# Patient Record
Sex: FEMALE | Race: BLACK OR AFRICAN AMERICAN | Employment: FULL TIME | URBAN - METROPOLITAN AREA
[De-identification: names, ages, dates, MRNs, and addresses within clinical notes are randomized per-mention and may not be internally consistent; named-entity substitution may affect disease eponyms.]

---

## 2024-01-05 ENCOUNTER — HOSPITAL ENCOUNTER (EMERGENCY)
Facility: HOSPITAL | Age: 24
Discharge: HOME | End: 2024-01-06
Payer: COMMERCIAL

## 2024-01-05 DIAGNOSIS — S61.411A LACERATION OF RIGHT HAND INVOLVING TENDON, INITIAL ENCOUNTER: Primary | ICD-10-CM

## 2024-01-05 DIAGNOSIS — S66.921A LACERATION OF RIGHT HAND INVOLVING TENDON, INITIAL ENCOUNTER: Primary | ICD-10-CM

## 2024-01-05 PROCEDURE — 99284 EMERGENCY DEPT VISIT MOD MDM: CPT | Performed by: EMERGENCY MEDICINE

## 2024-01-05 PROCEDURE — 99284 EMERGENCY DEPT VISIT MOD MDM: CPT

## 2024-01-05 PROCEDURE — 12002 RPR S/N/AX/GEN/TRNK2.6-7.5CM: CPT

## 2024-01-05 ASSESSMENT — COLUMBIA-SUICIDE SEVERITY RATING SCALE - C-SSRS
2. HAVE YOU ACTUALLY HAD ANY THOUGHTS OF KILLING YOURSELF?: NO
1. IN THE PAST MONTH, HAVE YOU WISHED YOU WERE DEAD OR WISHED YOU COULD GO TO SLEEP AND NOT WAKE UP?: NO
6. HAVE YOU EVER DONE ANYTHING, STARTED TO DO ANYTHING, OR PREPARED TO DO ANYTHING TO END YOUR LIFE?: NO

## 2024-01-06 ENCOUNTER — APPOINTMENT (OUTPATIENT)
Dept: RADIOLOGY | Facility: HOSPITAL | Age: 24
End: 2024-01-06
Payer: COMMERCIAL

## 2024-01-06 VITALS
HEART RATE: 109 BPM | RESPIRATION RATE: 16 BRPM | DIASTOLIC BLOOD PRESSURE: 80 MMHG | TEMPERATURE: 98 F | SYSTOLIC BLOOD PRESSURE: 133 MMHG | OXYGEN SATURATION: 99 %

## 2024-01-06 PROCEDURE — 2500000004 HC RX 250 GENERAL PHARMACY W/ HCPCS (ALT 636 FOR OP/ED)

## 2024-01-06 PROCEDURE — 73110 X-RAY EXAM OF WRIST: CPT | Mod: RT

## 2024-01-06 PROCEDURE — 2500000001 HC RX 250 WO HCPCS SELF ADMINISTERED DRUGS (ALT 637 FOR MEDICARE OP)

## 2024-01-06 PROCEDURE — 2500000005 HC RX 250 GENERAL PHARMACY W/O HCPCS

## 2024-01-06 PROCEDURE — 73110 X-RAY EXAM OF WRIST: CPT | Mod: RIGHT SIDE | Performed by: RADIOLOGY

## 2024-01-06 PROCEDURE — 90715 TDAP VACCINE 7 YRS/> IM: CPT

## 2024-01-06 PROCEDURE — 90471 IMMUNIZATION ADMIN: CPT

## 2024-01-06 PROCEDURE — 73130 X-RAY EXAM OF HAND: CPT | Mod: RIGHT SIDE | Performed by: RADIOLOGY

## 2024-01-06 PROCEDURE — 73130 X-RAY EXAM OF HAND: CPT | Mod: RT

## 2024-01-06 RX ORDER — LIDOCAINE HYDROCHLORIDE 10 MG/ML
20 INJECTION INFILTRATION; PERINEURAL ONCE
Status: COMPLETED | OUTPATIENT
Start: 2024-01-06 | End: 2024-01-06

## 2024-01-06 RX ORDER — CEPHALEXIN 250 MG/1
500 CAPSULE ORAL ONCE
Status: COMPLETED | OUTPATIENT
Start: 2024-01-06 | End: 2024-01-06

## 2024-01-06 RX ORDER — CEPHALEXIN 500 MG/1
500 CAPSULE ORAL 4 TIMES DAILY
Qty: 40 CAPSULE | Refills: 0 | Status: SHIPPED | OUTPATIENT
Start: 2024-01-06 | End: 2024-01-16

## 2024-01-06 RX ADMIN — TETANUS TOXOID, REDUCED DIPHTHERIA TOXOID AND ACELLULAR PERTUSSIS VACCINE, ADSORBED 0.5 ML: 5; 2.5; 8; 8; 2.5 SUSPENSION INTRAMUSCULAR at 00:10

## 2024-01-06 RX ADMIN — LIDOCAINE HYDROCHLORIDE 200 MG: 10 INJECTION, SOLUTION INFILTRATION; PERINEURAL at 00:05

## 2024-01-06 RX ADMIN — CEPHALEXIN 500 MG: 250 CAPSULE ORAL at 00:15

## 2024-01-06 ASSESSMENT — PAIN DESCRIPTION - PAIN TYPE: TYPE: ACUTE PAIN

## 2024-01-06 ASSESSMENT — PAIN - FUNCTIONAL ASSESSMENT: PAIN_FUNCTIONAL_ASSESSMENT: 0-10

## 2024-01-06 ASSESSMENT — PAIN SCALES - GENERAL: PAINLEVEL_OUTOF10: 10 - WORST POSSIBLE PAIN

## 2024-01-06 ASSESSMENT — PAIN DESCRIPTION - LOCATION: LOCATION: HAND

## 2024-01-06 ASSESSMENT — PAIN DESCRIPTION - ORIENTATION: ORIENTATION: RIGHT
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
## 2024-01-06 NOTE — DISCHARGE INSTRUCTIONS
You were seen today in the emergency department due to concerns following a laceration to your wrist.  We updated your tetanus.  Your laceration was closed by orthopedics.  Please attend the follow-up appointment that they have given you.  Please return for worsening weakness numbness tingling or coldness in your hand.  Please return for signs of infection which include fevers chills redness in the area or pus draining from the area.  I sent your prescription for Keflex which is an antibiotic please take this as directed for the full course even if you feel well.
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
## 2024-01-09 ENCOUNTER — OFFICE VISIT (OUTPATIENT)
Dept: ORTHOPEDIC SURGERY | Facility: CLINIC | Age: 24
End: 2024-01-09
Payer: COMMERCIAL

## 2024-01-09 VITALS — HEIGHT: 63 IN | BODY MASS INDEX: 24.27 KG/M2 | WEIGHT: 137 LBS

## 2024-01-09 DIAGNOSIS — S66.921A LACERATION OF RIGHT WRIST WITH TENDON INVOLVEMENT, INITIAL ENCOUNTER: Primary | ICD-10-CM

## 2024-01-09 DIAGNOSIS — S61.511A LACERATION OF RIGHT WRIST WITH TENDON INVOLVEMENT, INITIAL ENCOUNTER: Primary | ICD-10-CM

## 2024-01-09 DIAGNOSIS — S66.921A: ICD-10-CM

## 2024-01-09 DIAGNOSIS — S61.511A: ICD-10-CM

## 2024-01-09 PROCEDURE — 99204 OFFICE O/P NEW MOD 45 MIN: CPT | Performed by: ORTHOPAEDIC SURGERY

## 2024-01-09 PROCEDURE — 1036F TOBACCO NON-USER: CPT | Performed by: ORTHOPAEDIC SURGERY

## 2024-01-09 ASSESSMENT — PAIN - FUNCTIONAL ASSESSMENT: PAIN_FUNCTIONAL_ASSESSMENT: NO/DENIES PAIN
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
## 2024-01-11 ENCOUNTER — ANESTHESIA (OUTPATIENT)
Dept: OPERATING ROOM | Facility: HOSPITAL | Age: 24
End: 2024-01-11
Payer: COMMERCIAL

## 2024-01-11 ENCOUNTER — ANESTHESIA EVENT (OUTPATIENT)
Dept: OPERATING ROOM | Facility: HOSPITAL | Age: 24
End: 2024-01-11
Payer: COMMERCIAL

## 2024-01-11 ENCOUNTER — HOSPITAL ENCOUNTER (OUTPATIENT)
Facility: HOSPITAL | Age: 24
Setting detail: OUTPATIENT SURGERY
Discharge: HOME | End: 2024-01-11
Attending: ORTHOPAEDIC SURGERY | Admitting: ORTHOPAEDIC SURGERY
Payer: COMMERCIAL

## 2024-01-11 VITALS
HEART RATE: 99 BPM | DIASTOLIC BLOOD PRESSURE: 78 MMHG | OXYGEN SATURATION: 99 % | SYSTOLIC BLOOD PRESSURE: 126 MMHG | TEMPERATURE: 97.3 F | RESPIRATION RATE: 16 BRPM

## 2024-01-11 DIAGNOSIS — S61.511A: Primary | ICD-10-CM

## 2024-01-11 DIAGNOSIS — S66.921A: Primary | ICD-10-CM

## 2024-01-11 LAB — PREGNANCY TEST URINE, POC: NEGATIVE

## 2024-01-11 PROCEDURE — A25270 PR REFOREARM TEND/MUSC,EXTEN,PRIM,EA

## 2024-01-11 PROCEDURE — 94760 N-INVAS EAR/PLS OXIMETRY 1: CPT

## 2024-01-11 PROCEDURE — 81025 URINE PREGNANCY TEST: CPT | Performed by: ANESTHESIOLOGY

## 2024-01-11 PROCEDURE — 25270 REPAIR FOREARM TENDON/MUSCLE: CPT | Performed by: ORTHOPAEDIC SURGERY

## 2024-01-11 PROCEDURE — 7100000010 HC PHASE TWO TIME - EACH INCREMENTAL 1 MINUTE: Performed by: ORTHOPAEDIC SURGERY

## 2024-01-11 PROCEDURE — 3700000002 HC GENERAL ANESTHESIA TIME - EACH INCREMENTAL 1 MINUTE: Performed by: ORTHOPAEDIC SURGERY

## 2024-01-11 PROCEDURE — A25270 PR REFOREARM TEND/MUSC,EXTEN,PRIM,EA: Performed by: STUDENT IN AN ORGANIZED HEALTH CARE EDUCATION/TRAINING PROGRAM

## 2024-01-11 PROCEDURE — 2500000004 HC RX 250 GENERAL PHARMACY W/ HCPCS (ALT 636 FOR OP/ED): Performed by: ANESTHESIOLOGY

## 2024-01-11 PROCEDURE — 3700000001 HC GENERAL ANESTHESIA TIME - INITIAL BASE CHARGE: Performed by: ORTHOPAEDIC SURGERY

## 2024-01-11 PROCEDURE — 2500000004 HC RX 250 GENERAL PHARMACY W/ HCPCS (ALT 636 FOR OP/ED)

## 2024-01-11 PROCEDURE — 2500000005 HC RX 250 GENERAL PHARMACY W/O HCPCS: Performed by: ORTHOPAEDIC SURGERY

## 2024-01-11 PROCEDURE — 7100000002 HC RECOVERY ROOM TIME - EACH INCREMENTAL 1 MINUTE: Performed by: ORTHOPAEDIC SURGERY

## 2024-01-11 PROCEDURE — 7100000001 HC RECOVERY ROOM TIME - INITIAL BASE CHARGE: Performed by: ORTHOPAEDIC SURGERY

## 2024-01-11 PROCEDURE — 7100000009 HC PHASE TWO TIME - INITIAL BASE CHARGE: Performed by: ORTHOPAEDIC SURGERY

## 2024-01-11 PROCEDURE — 3600000003 HC OR TIME - INITIAL BASE CHARGE - PROCEDURE LEVEL THREE: Performed by: ORTHOPAEDIC SURGERY

## 2024-01-11 PROCEDURE — 3600000008 HC OR TIME - EACH INCREMENTAL 1 MINUTE - PROCEDURE LEVEL THREE: Performed by: ORTHOPAEDIC SURGERY

## 2024-01-11 PROCEDURE — 2500000005 HC RX 250 GENERAL PHARMACY W/O HCPCS

## 2024-01-11 RX ORDER — SCOLOPAMINE TRANSDERMAL SYSTEM 1 MG/1
1 PATCH, EXTENDED RELEASE TRANSDERMAL
Status: DISCONTINUED | OUTPATIENT
Start: 2024-01-11 | End: 2024-01-11 | Stop reason: HOSPADM

## 2024-01-11 RX ORDER — ONDANSETRON HYDROCHLORIDE 2 MG/ML
4 INJECTION, SOLUTION INTRAVENOUS ONCE AS NEEDED
Status: DISCONTINUED | OUTPATIENT
Start: 2024-01-11 | End: 2024-01-11 | Stop reason: HOSPADM

## 2024-01-11 RX ORDER — FENTANYL CITRATE 50 UG/ML
INJECTION, SOLUTION INTRAMUSCULAR; INTRAVENOUS AS NEEDED
Status: DISCONTINUED | OUTPATIENT
Start: 2024-01-11 | End: 2024-01-11

## 2024-01-11 RX ORDER — PROPOFOL 10 MG/ML
INJECTION, EMULSION INTRAVENOUS AS NEEDED
Status: DISCONTINUED | OUTPATIENT
Start: 2024-01-11 | End: 2024-01-11

## 2024-01-11 RX ORDER — CEFAZOLIN 1 G/1
INJECTION, POWDER, FOR SOLUTION INTRAVENOUS AS NEEDED
Status: DISCONTINUED | OUTPATIENT
Start: 2024-01-11 | End: 2024-01-11

## 2024-01-11 RX ORDER — CEFAZOLIN SODIUM 2 G/100ML
2 INJECTION, SOLUTION INTRAVENOUS ONCE
Status: DISCONTINUED | OUTPATIENT
Start: 2024-01-11 | End: 2024-01-11 | Stop reason: HOSPADM

## 2024-01-11 RX ORDER — ALBUTEROL SULFATE 0.83 MG/ML
2.5 SOLUTION RESPIRATORY (INHALATION) ONCE AS NEEDED
Status: DISCONTINUED | OUTPATIENT
Start: 2024-01-11 | End: 2024-01-11 | Stop reason: HOSPADM

## 2024-01-11 RX ORDER — LABETALOL HYDROCHLORIDE 5 MG/ML
5 INJECTION, SOLUTION INTRAVENOUS ONCE AS NEEDED
Status: DISCONTINUED | OUTPATIENT
Start: 2024-01-11 | End: 2024-01-11 | Stop reason: HOSPADM

## 2024-01-11 RX ORDER — HYDRALAZINE HYDROCHLORIDE 20 MG/ML
5 INJECTION INTRAMUSCULAR; INTRAVENOUS EVERY 30 MIN PRN
Status: DISCONTINUED | OUTPATIENT
Start: 2024-01-11 | End: 2024-01-11 | Stop reason: HOSPADM

## 2024-01-11 RX ORDER — SODIUM CHLORIDE, SODIUM LACTATE, POTASSIUM CHLORIDE, CALCIUM CHLORIDE 600; 310; 30; 20 MG/100ML; MG/100ML; MG/100ML; MG/100ML
50 INJECTION, SOLUTION INTRAVENOUS CONTINUOUS
Status: DISCONTINUED | OUTPATIENT
Start: 2024-01-11 | End: 2024-01-11 | Stop reason: HOSPADM

## 2024-01-11 RX ORDER — MIDAZOLAM HYDROCHLORIDE 1 MG/ML
INJECTION INTRAMUSCULAR; INTRAVENOUS AS NEEDED
Status: DISCONTINUED | OUTPATIENT
Start: 2024-01-11 | End: 2024-01-11

## 2024-01-11 RX ORDER — ACETAMINOPHEN 325 MG/1
650 TABLET ORAL EVERY 4 HOURS PRN
Status: DISCONTINUED | OUTPATIENT
Start: 2024-01-11 | End: 2024-01-11 | Stop reason: HOSPADM

## 2024-01-11 RX ORDER — SODIUM CHLORIDE, SODIUM LACTATE, POTASSIUM CHLORIDE, CALCIUM CHLORIDE 600; 310; 30; 20 MG/100ML; MG/100ML; MG/100ML; MG/100ML
100 INJECTION, SOLUTION INTRAVENOUS CONTINUOUS
Status: DISCONTINUED | OUTPATIENT
Start: 2024-01-11 | End: 2024-01-11 | Stop reason: HOSPADM

## 2024-01-11 RX ORDER — HYDROCODONE BITARTRATE AND ACETAMINOPHEN 5; 325 MG/1; MG/1
1 TABLET ORAL EVERY 6 HOURS PRN
Qty: 28 TABLET | Refills: 0 | Status: SHIPPED | OUTPATIENT
Start: 2024-01-11 | End: 2024-01-18

## 2024-01-11 RX ORDER — LIDOCAINE HYDROCHLORIDE 10 MG/ML
0.1 INJECTION, SOLUTION EPIDURAL; INFILTRATION; INTRACAUDAL; PERINEURAL ONCE
Status: DISCONTINUED | OUTPATIENT
Start: 2024-01-11 | End: 2024-01-11 | Stop reason: HOSPADM

## 2024-01-11 RX ORDER — KETOROLAC TROMETHAMINE 15 MG/ML
INJECTION, SOLUTION INTRAMUSCULAR; INTRAVENOUS AS NEEDED
Status: DISCONTINUED | OUTPATIENT
Start: 2024-01-11 | End: 2024-01-11

## 2024-01-11 RX ORDER — LIDOCAINE HYDROCHLORIDE 20 MG/ML
INJECTION, SOLUTION EPIDURAL; INFILTRATION; INTRACAUDAL; PERINEURAL AS NEEDED
Status: DISCONTINUED | OUTPATIENT
Start: 2024-01-11 | End: 2024-01-11

## 2024-01-11 RX ORDER — ONDANSETRON HYDROCHLORIDE 2 MG/ML
INJECTION, SOLUTION INTRAVENOUS AS NEEDED
Status: DISCONTINUED | OUTPATIENT
Start: 2024-01-11 | End: 2024-01-11

## 2024-01-11 RX ADMIN — FENTANYL CITRATE 50 MCG: 50 INJECTION, SOLUTION INTRAMUSCULAR; INTRAVENOUS at 14:47

## 2024-01-11 RX ADMIN — DEXAMETHASONE SODIUM PHOSPHATE 4 MG: 4 INJECTION, SOLUTION INTRAMUSCULAR; INTRAVENOUS at 14:41

## 2024-01-11 RX ADMIN — FENTANYL CITRATE 100 MCG: 50 INJECTION, SOLUTION INTRAMUSCULAR; INTRAVENOUS at 14:29

## 2024-01-11 RX ADMIN — CEFAZOLIN 2 G: 330 INJECTION, POWDER, FOR SOLUTION INTRAMUSCULAR; INTRAVENOUS at 14:35

## 2024-01-11 RX ADMIN — ONDANSETRON 4 MG: 2 INJECTION INTRAMUSCULAR; INTRAVENOUS at 15:29

## 2024-01-11 RX ADMIN — MIDAZOLAM HYDROCHLORIDE 2 MG: 1 INJECTION, SOLUTION INTRAMUSCULAR; INTRAVENOUS at 14:22

## 2024-01-11 RX ADMIN — PROPOFOL 200 MG: 10 INJECTION, EMULSION INTRAVENOUS at 14:27

## 2024-01-11 RX ADMIN — LIDOCAINE HYDROCHLORIDE 100 MG: 20 INJECTION, SOLUTION EPIDURAL; INFILTRATION; INTRACAUDAL; PERINEURAL at 14:27

## 2024-01-11 RX ADMIN — KETOROLAC TROMETHAMINE 30 MG: 15 INJECTION, SOLUTION INTRAMUSCULAR; INTRAVENOUS at 15:29

## 2024-01-11 RX ADMIN — SODIUM CHLORIDE, POTASSIUM CHLORIDE, SODIUM LACTATE AND CALCIUM CHLORIDE: 600; 310; 30; 20 INJECTION, SOLUTION INTRAVENOUS at 15:25

## 2024-01-11 RX ADMIN — SODIUM CHLORIDE, POTASSIUM CHLORIDE, SODIUM LACTATE AND CALCIUM CHLORIDE: 600; 310; 30; 20 INJECTION, SOLUTION INTRAVENOUS at 14:20

## 2024-01-11 RX ADMIN — SODIUM CHLORIDE 10 MCG: 9 INJECTION, SOLUTION INTRAVENOUS at 15:10

## 2024-01-11 RX ADMIN — FENTANYL CITRATE 50 MCG: 50 INJECTION, SOLUTION INTRAMUSCULAR; INTRAVENOUS at 14:56

## 2024-01-11 SDOH — HEALTH STABILITY: MENTAL HEALTH: CURRENT SMOKER: 0

## 2024-01-11 ASSESSMENT — PAIN SCALES - GENERAL
PAINLEVEL_OUTOF10: 0 - NO PAIN

## 2024-01-11 ASSESSMENT — PAIN - FUNCTIONAL ASSESSMENT
PAIN_FUNCTIONAL_ASSESSMENT: 0-10
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
## 2024-01-11 NOTE — ANESTHESIA PROCEDURE NOTES
Airway  Date/Time: 1/11/2024 2:30 PM  Urgency: elective    Airway not difficult    Staffing  Performed: CRNA   Authorized by: Dafne Shin MD    Performed by: MOOKIE Wei-KRUPA  Patient location during procedure: OR    Indications and Patient Condition  Indications for airway management: anesthesia  Spontaneous Ventilation: absent  Sedation level: deep  Preoxygenated: yes  Patient position: sniffing  MILS maintained throughout  Mask difficulty assessment: 0 - not attempted  No planned trial extubation    Final Airway Details  Final airway type: supraglottic airway      Successful airway: Size 4     Number of attempts at approach: 1  Number of other approaches attempted: 0
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
## 2024-01-11 NOTE — H&P
Surgical Attestation:     I have reviewed the patient's History and Physical Examination.  I have personally seen and evaluated the patient, repeating key portions.  There is no significant interval change.   Surgery is still indicated.  Yes     Consent reviewed and signed by patient/family: Yes     Operative site verified and marked: Yes    Melvin Mauricio MD   Orthopaedic Surgery, PGY-2

## 2024-01-11 NOTE — ANESTHESIA POSTPROCEDURE EVALUATION
Patient: Kalyn Kearns    Procedure Summary       Date: 01/11/24 Room / Location: [facility] OR 05 / [facility] OR    Anesthesia Start: 1425 Anesthesia Stop: 1610    Procedure: Right Wrist Exploration with Extensor Tendon Repair (Right: Wrist) Diagnosis:       Laceration of wrist, right, with tendon involvement, initial encounter      (Laceration of wrist, right, with tendon involvement, initial encounter [S61.511A, S66.921A])    Surgeons: Eber Rosen MD Responsible Provider: Juwan Herbert MD    Anesthesia Type: general ASA Status: 1            Anesthesia Type: general    Vitals Value Taken Time   /72 01/11/24 1609   Temp 36.6 °C (97.9 °F) 01/11/24 1609   Pulse 116 01/11/24 1609   Resp 16 01/11/24 1609   SpO2 100 % 01/11/24 1609       Anesthesia Post Evaluation    Patient location during evaluation: bedside  Patient participation: complete - patient participated  Level of consciousness: awake  Pain management: adequate  Multimodal analgesia pain management approach  Airway patency: patent  Cardiovascular status: stable  Respiratory status: spontaneous ventilation and unassisted  Hydration status: acceptable  Postoperative Nausea and Vomiting: none  Comments: No significant PONV.        There were no known notable events for this encounter.

## 2024-01-12 ASSESSMENT — PAIN SCALES - GENERAL: PAINLEVEL_OUTOF10: 2

## 2024-01-12 NOTE — H&P
[facility] Department of Orthopaedic Surgery   Surgical History & Physical <30 Days  01/11/24    Reason for Surgery: R wrist extensor tendon lacerations  Planned Procedure: Repair of R wrist extensor tendon lacerations    History & Physical Reviewed:  I have reviewed the History and Physical for obtained within the last 30 days. Relevant findings and updates are noted below:  No significant changes.    Home medications were reviewed with significant updates noted below:  No significant changes.    ERAS patient?: No    COVID-19 Risk Consent:   Surgeon has reviewed the key risks related to [place] COVID-19 and subsequent sequelae.     01/12/24 at 9:46 AM - Melvin Mauricio MD

## 2024-01-29 ENCOUNTER — EVALUATION (OUTPATIENT)
Dept: OCCUPATIONAL THERAPY | Facility: CLINIC | Age: 24
End: 2024-01-29
Payer: COMMERCIAL

## 2024-01-29 ENCOUNTER — OFFICE VISIT (OUTPATIENT)
Dept: ORTHOPEDIC SURGERY | Facility: CLINIC | Age: 24
End: 2024-01-29
Payer: MEDICAID

## 2024-01-29 VITALS — WEIGHT: 137 LBS | BODY MASS INDEX: 24.27 KG/M2 | HEIGHT: 63 IN

## 2024-01-29 DIAGNOSIS — S66.921A LACERATION OF UNSPECIFIED MUSCLE, FASCIA AND TENDON AT WRIST AND HAND LEVEL, RIGHT HAND, INITIAL ENCOUNTER: ICD-10-CM

## 2024-01-29 DIAGNOSIS — S66.921D LACERATION OF WRIST, RIGHT, WITH TENDON INVOLVEMENT, SUBSEQUENT ENCOUNTER: Primary | ICD-10-CM

## 2024-01-29 DIAGNOSIS — S66.921A LACERATION OF RIGHT WRIST WITH TENDON INVOLVEMENT, INITIAL ENCOUNTER: Primary | ICD-10-CM

## 2024-01-29 DIAGNOSIS — S61.511D LACERATION OF WRIST, RIGHT, WITH TENDON INVOLVEMENT, SUBSEQUENT ENCOUNTER: Primary | ICD-10-CM

## 2024-01-29 DIAGNOSIS — S61.551A OPEN BITE OF RIGHT WRIST, INITIAL ENCOUNTER: ICD-10-CM

## 2024-01-29 DIAGNOSIS — S61.511A LACERATION OF RIGHT WRIST WITH TENDON INVOLVEMENT, INITIAL ENCOUNTER: Primary | ICD-10-CM

## 2024-01-29 PROCEDURE — L3808 WHFO, RIGID W/O JOINTS: HCPCS | Performed by: OCCUPATIONAL THERAPIST

## 2024-01-29 PROCEDURE — 99024 POSTOP FOLLOW-UP VISIT: CPT | Performed by: ORTHOPAEDIC SURGERY

## 2024-01-29 PROCEDURE — 1036F TOBACCO NON-USER: CPT | Performed by: ORTHOPAEDIC SURGERY

## 2024-01-29 ASSESSMENT — PAIN - FUNCTIONAL ASSESSMENT: PAIN_FUNCTIONAL_ASSESSMENT: NO/DENIES PAIN

## 2024-01-29 NOTE — PROGRESS NOTES
Date of surgery: 01/11/24         Surgery(s) performed: Right wrist zone 7 extensor tendon repair         Patient presents today for her first post-operative visit, performed on 01/29/24. Her pain is well controlled. No new complaints today.          Exam findings: healed surgical incision. Maintained position of thumb and index finger extension after surgery.     Impression: Multiple zone 7 extensor tendon repairs right hand         Plan: She was referred to therapy for zone seven extensor tendon repair rehab protocol. She will follow up in 4 weeks for repeat clinical exam and wound check.         Eber Rosen MD          [employer] [facility] [employer]     Department of Orthopaedic Surgery     Chief of Hand and Upper Extremity Surgery     [facility]     Scribe Attestation  By signing my name below, ILucy Scribe   attest that this documentation has been prepared under the direction and in the presence of Eber Rosen MD.
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
## 2024-02-06 ENCOUNTER — TREATMENT (OUTPATIENT)
Dept: OCCUPATIONAL THERAPY | Facility: CLINIC | Age: 24
End: 2024-02-06
Payer: COMMERCIAL

## 2024-02-06 DIAGNOSIS — S66.921A LACERATION OF UNSPECIFIED MUSCLE, FASCIA AND TENDON AT WRIST AND HAND LEVEL, RIGHT HAND, INITIAL ENCOUNTER: ICD-10-CM

## 2024-02-06 DIAGNOSIS — S66.921D LACERATION OF WRIST, RIGHT, WITH TENDON INVOLVEMENT, SUBSEQUENT ENCOUNTER: Primary | ICD-10-CM

## 2024-02-06 DIAGNOSIS — S61.551A OPEN BITE OF RIGHT WRIST, INITIAL ENCOUNTER: ICD-10-CM

## 2024-02-06 DIAGNOSIS — S61.511D LACERATION OF WRIST, RIGHT, WITH TENDON INVOLVEMENT, SUBSEQUENT ENCOUNTER: Primary | ICD-10-CM

## 2024-02-06 PROCEDURE — 97140 MANUAL THERAPY 1/> REGIONS: CPT | Mod: GO | Performed by: OCCUPATIONAL THERAPIST
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
## 2024-02-07 ENCOUNTER — TREATMENT (OUTPATIENT)
Dept: OCCUPATIONAL THERAPY | Facility: CLINIC | Age: 24
End: 2024-02-07
Payer: COMMERCIAL

## 2024-02-07 DIAGNOSIS — S66.921A LACERATION OF UNSPECIFIED MUSCLE, FASCIA AND TENDON AT WRIST AND HAND LEVEL, RIGHT HAND, INITIAL ENCOUNTER: ICD-10-CM

## 2024-02-07 DIAGNOSIS — S61.551A OPEN BITE OF RIGHT WRIST, INITIAL ENCOUNTER: ICD-10-CM

## 2024-02-07 PROCEDURE — 97110 THERAPEUTIC EXERCISES: CPT | Mod: GO | Performed by: OCCUPATIONAL THERAPIST

## 2024-02-07 PROCEDURE — 97140 MANUAL THERAPY 1/> REGIONS: CPT | Mod: GO | Performed by: OCCUPATIONAL THERAPIST

## 2024-02-07 NOTE — PROGRESS NOTES
"    Occupational Therapy Outpatinet Treatment Note      Patient Name: Kalyn Kearns  MRN: [id number]  Today's Date: 2/7/2024  Time Calculation  Start Time: 1100  Stop Time: 1142  Time Calculation (min): 42 min    Insurance:  Visit number: 3 of 15  Authorization: required  Insurance info:  Tesha Denise Certification Period:   Start: 1/29/24   End: 6/28/24      Current Problem  1. Open bite of right wrist, initial encounter  Follow Up In Occupational Therapy      2. Laceration of unspecified muscle, fascia and tendon at wrist and hand level, right hand, initial encounter  Follow Up In Occupational Therapy          Referred by: Dr. Rosen  Diagnosis: Zone 6 extensor tendon repair    Subjective   \"My hand is doing a lot better but it just  feel pretty tight\"  DOI: 1/5/24  DOS: 1/11/24    Precautions:   NWB    Pain:   1-5/10  Location: R dorsal hand hand and wrist  Description: aching, tight, swollen      Patients Living Environment: Reviewed and no concern  Lives with:   Primary Language: English  Patient's Goal(s) for Therapy: regain my normal hand function      Objective     RIGHT HAND   (ext/flex)   MCP PIP DIP DPC   IF  0/0 0/80 0/45    MF 0/0 0/70 0/45    RF 0/0 0/60 0/45    SF 0/0 0/60 0/5    Thumb 0/0          Edema:  R hand: 45.6cm  L hand: 41cm      Outcome Measures:        Quick DASH:  75%      HOME PROGRAM      1) wrist AROM      2) Isolated EDC AROM      3) Tenodesis AROM       4) Towel scrunches AROM      5) MEM      6) Scar massage                 TODAY'S TREATMENT    Manual:  - Extensive MEM dorsal hand and thumb and wrist to reduce dorsal hand swelling x 15 minutes  - Gentle wrist stretching extension and flexion to reduce dorsal stiffness x 5 minutes  - STM to volar and dorsal hand to reduce intrinsic tightness x 5 minutes  -Composite fist stretching to increase range of motion followed by AROM with reverse MCP blocking x 5 minutes    Therapeutic ex:  - Wrist AROM 3x10 for ROM  - Isolated EDC AROM " 3x10 for ROM  - Tenodesis AROM 3x10 for ROM  - Towel scrunches AROM 3x10 for ROM  - reviewed and video recorded to ensure proper carryover to home         EDUCATION: Risk/benefits discussed, Home exercise program, orthosis wear schedule/ purpose/ precautions, care of orthosis, wound care, edema control, activity modifications, joint protection, pain/symptom management, injury pathology, and plan of care,        Assessment:  Patient was mariza to maintain reduced hand swelling since yesterdays visit although still is instructed to continue with MEM at home. Dorsal hand tightness still heavily present although there were signs of improved MCP PROM.  Tolerated scar massage well and understands technique to continue at home.  All upgraded exercises were video recorded today with her personal cell phone to ensure proper carryover to home.  Isolated EDC ex is greatest challenge for her given the underlying dorsal hand tightness and liekly  adhesions present.        Plan:  Planned Interventions include: therapeutic exercise, self-care home management, manual therapy, therapeutic activities, , neuromuscular coordination, vasopneumatic, dry needling, and orthosis fabrication.  Frequency: 2 x Week  Duration: 12 Weeks  Goals: Set and discussed today      Goals - Patient will:       Goal 1) verbalize/demonstrate/understanding of diagnosis and precautions       Goal 2) verbalize purpose of orthosis, wearing schedule, care and precautions       Goal 3) don/doff orthosis correctly and independently       Goal 4) verbalize/demonstrate home program, activity modification and/or adaptive equipment    All goals set today and have been met.    Plan of care was developed with input and agreement by the patient      Theodore Monteiro OT

## 2024-02-12 ENCOUNTER — TREATMENT (OUTPATIENT)
Dept: OCCUPATIONAL THERAPY | Facility: CLINIC | Age: 24
End: 2024-02-12
Payer: COMMERCIAL

## 2024-02-12 DIAGNOSIS — S61.511D LACERATION OF WRIST, RIGHT, WITH TENDON INVOLVEMENT, SUBSEQUENT ENCOUNTER: Primary | ICD-10-CM

## 2024-02-12 DIAGNOSIS — S61.551A OPEN BITE OF RIGHT WRIST, INITIAL ENCOUNTER: ICD-10-CM

## 2024-02-12 DIAGNOSIS — S66.921A LACERATION OF UNSPECIFIED MUSCLE, FASCIA AND TENDON AT WRIST AND HAND LEVEL, RIGHT HAND, INITIAL ENCOUNTER: ICD-10-CM

## 2024-02-12 DIAGNOSIS — S66.921D LACERATION OF WRIST, RIGHT, WITH TENDON INVOLVEMENT, SUBSEQUENT ENCOUNTER: Primary | ICD-10-CM

## 2024-02-12 PROCEDURE — 97035 APP MDLTY 1+ULTRASOUND EA 15: CPT | Mod: GO | Performed by: OCCUPATIONAL THERAPIST

## 2024-02-12 PROCEDURE — 97110 THERAPEUTIC EXERCISES: CPT | Mod: GO | Performed by: OCCUPATIONAL THERAPIST

## 2024-02-12 PROCEDURE — 97140 MANUAL THERAPY 1/> REGIONS: CPT | Mod: GO | Performed by: OCCUPATIONAL THERAPIST

## 2024-02-12 NOTE — PROGRESS NOTES
Occupational Therapy Outpatinet Treatment Note      Patient Name: Kalyn Kearns  MRN: [id number]  Today's Date: 2/12/2024  Time Calculation  Start Time: 0100  Stop Time: 0142  Time Calculation (min): 42 min    Insurance:  Visit number: 4 of 15  Authorization: required  Insurance info:  Tesha Denise Certification Period:   Start: 1/29/24   End: 6/28/24      Current Problem  1. Laceration of wrist, right, with tendon involvement, subsequent encounter        2. Open bite of right wrist, initial encounter  Follow Up In Occupational Therapy      3. Laceration of unspecified muscle, fascia and tendon at wrist and hand level, right hand, initial encounter  Follow Up In Occupational Therapy          Referred by: Dr. Rosen  Diagnosis: Zone 6 extensor tendon repair    Subjective   Patient arrives today exhibiting acute edema blisters around her healed surgical site  DOI: 1/5/24  DOS: 1/11/24    Precautions:   NWB    Pain:   1-5/10  Location: R dorsal hand hand and wrist  Description: aching, tight, swollen      Patients Living Environment: Reviewed and no concern  Lives with:   Primary Language: English  Patient's Goal(s) for Therapy: regain my normal hand function      Objective     RIGHT HAND   (ext/flex)   MCP PIP DIP DPC   IF  0/0 0/80 0/45    MF 0/0 0/70 0/45    RF 0/0 0/60 0/45    SF 0/0 0/60 0/5    Thumb 0/0          Edema:  R hand: 45.6cm  L hand: 41cm      Outcome Measures:        Quick DASH:  75%      HOME PROGRAM      1) wrist AROM      2) Isolated EDC AROM      3) Tenodesis AROM       4) Towel scrunches AROM      5) MEM      6) Scar massage      7) reverse MCP blocking                   TODAY'S TREATMENT    Ultrasound  - 3.3MHz x 1.0Wcm2 to dorsal hand surgical site to increase scar tissue extensibility x 8 min    Manual:  - Extensive MEM dorsal hand and thumb and wrist to reduce dorsal hand swelling x 10 minutes  - Gentle wrist stretching extension and flexion to reduce dorsal stiffness x 5 minutes  -  Composite fist stretching to increase range of motion followed by AROM with reverse MCP blocking x 5 minutes    Therapeutic ex:  - Wrist AROM 3x10 for ROM  - Isolated EDC AROM 3x10 for ROM  - Tenodesis AROM 3x10 for ROM  - Towel scrunches AROM 3x10 for ROM  - reverse MCP blocking AROM to increase ROM 3x25      EDUCATION: Risk/benefits discussed, Home exercise program, orthosis wear schedule/ purpose/ precautions, care of orthosis, wound care, edema control, activity modifications, joint protection, pain/symptom management, injury pathology, and plan of care,        Assessment:  Dorsal hand tightness still significantly present although there were signs of improved MCP PROM.  She was provided with a compression glove now to further address remaining edema.  Reverse MCP blocking upgraded exercises was video recorded today with her personal cell phone to ensure proper carryover to home.       Plan:  Planned Interventions include: therapeutic exercise, self-care home management, manual therapy, therapeutic activities, , neuromuscular coordination, vasopneumatic, dry needling, and orthosis fabrication.  Frequency: 2 x Week  Duration: 12 Weeks  Goals: Set and discussed today      Goals - Patient will:       Goal 1) verbalize/demonstrate/understanding of diagnosis and precautions       Goal 2) verbalize purpose of orthosis, wearing schedule, care and precautions       Goal 3) don/doff orthosis correctly and independently       Goal 4) verbalize/demonstrate home program, activity modification and/or adaptive equipment    All goals set today and have been met.    Plan of care was developed with input and agreement by the patient      Theodore Monteiro OT

## 2024-02-16 ENCOUNTER — TREATMENT (OUTPATIENT)
Dept: OCCUPATIONAL THERAPY | Facility: CLINIC | Age: 24
End: 2024-02-16
Payer: COMMERCIAL

## 2024-02-16 DIAGNOSIS — S61.511D LACERATION OF WRIST, RIGHT, WITH TENDON INVOLVEMENT, SUBSEQUENT ENCOUNTER: Primary | ICD-10-CM

## 2024-02-16 DIAGNOSIS — S66.921D LACERATION OF WRIST, RIGHT, WITH TENDON INVOLVEMENT, SUBSEQUENT ENCOUNTER: Primary | ICD-10-CM

## 2024-02-16 DIAGNOSIS — S61.551A OPEN BITE OF RIGHT WRIST, INITIAL ENCOUNTER: ICD-10-CM

## 2024-02-16 DIAGNOSIS — S66.921A LACERATION OF UNSPECIFIED MUSCLE, FASCIA AND TENDON AT WRIST AND HAND LEVEL, RIGHT HAND, INITIAL ENCOUNTER: ICD-10-CM

## 2024-02-16 PROCEDURE — 97140 MANUAL THERAPY 1/> REGIONS: CPT | Mod: GO | Performed by: OCCUPATIONAL THERAPIST

## 2024-02-16 PROCEDURE — 97112 NEUROMUSCULAR REEDUCATION: CPT | Mod: GO | Performed by: OCCUPATIONAL THERAPIST

## 2024-02-16 PROCEDURE — 97035 APP MDLTY 1+ULTRASOUND EA 15: CPT | Mod: GO | Performed by: OCCUPATIONAL THERAPIST

## 2024-02-16 NOTE — PROGRESS NOTES
Occupational Therapy Outpatinet Treatment Note      Patient Name: Kalyn Kearns  MRN: [id number]  Today's Date: 2/16/2024  Time Calculation  Start Time: 1015  Stop Time: 1100  Time Calculation (min): 45 min    Insurance:  Visit number: 5 of 15  Authorization: required  Insurance info:  Tesha Denise Certification Period:   Start: 1/29/24   End: 6/28/24      Current Problem  1. Laceration of wrist, right, with tendon involvement, subsequent encounter        2. Open bite of right wrist, initial encounter  Follow Up In Occupational Therapy      3. Laceration of unspecified muscle, fascia and tendon at wrist and hand level, right hand, initial encounter  Follow Up In Occupational Therapy          Referred by: Dr. Rosen  Diagnosis: Zone 6 extensor tendon repair    Subjective   Patient arrives today exhibiting acute edema blisters around her healed surgical site  DOI: 1/5/24  DOS: 1/11/24    Precautions:   NWB    Pain:   1-5/10  Location: R dorsal hand hand and wrist  Description: aching, tight, swollen      Patients Living Environment: Reviewed and no concern  Lives with:   Primary Language: English  Patient's Goal(s) for Therapy: regain my normal hand function      Objective     RIGHT HAND   (ext/flex)   MCP PIP DIP DPC   IF  0/0 0/80 0/45    MF 0/0 0/70 0/45    RF 0/0 0/60 0/45    SF 0/0 0/60 0/5    Thumb 0/0          Edema:  R hand: 45.6cm  L hand: 41cm      Outcome Measures:        Quick DASH:  75%      HOME PROGRAM      1) wrist AROM      2) Isolated EDC AROM      3) Tenodesis AROM       4) Towel scrunches AROM      5) MEM      6) Scar massage      7) reverse MCP blocking                   TODAY'S TREATMENT    Ultrasound  - 3.3MHz x 1.0Wcm2 to dorsal hand surgical site to increase scar tissue extensibility x 8 min    Manual:  - Extensive MEM dorsal hand and thumb and wrist to reduce dorsal hand swelling x 10 minutes  - Gentle wrist stretching extension and flexion to reduce dorsal stiffness x 5 minutes  -  Composite fist stretching to increase range of motion followed by AROM with reverse MCP blocking x 5 minutes    Neuro re-ed:  - AROM tendon gliding within fluidotherapy for desensitization and re-ed of proprioception of all digits and wrist x 12min      EDUCATION: Risk/benefits discussed, Home exercise program, orthosis wear schedule/ purpose/ precautions, care of orthosis, wound care, edema control, activity modifications, joint protection, pain/symptom management, injury pathology, and plan of care,        Assessment:  Extrinsic hand tightness still significantly present.  Significantly reduced hand swelling since wearing edema compression glove.   Blistering to dorsal had has diminished about 90% compared to previous visit.  Understands to no longer wear her orthosis inside her home to promote as much active use of her hand within restrictions as possible.       Plan:  Planned Interventions include: therapeutic exercise, self-care home management, manual therapy, therapeutic activities, , neuromuscular coordination, vasopneumatic, dry needling, and orthosis fabrication.  Frequency: 2 x Week  Duration: 12 Weeks  Goals: Set and discussed today      Goals - Patient will:       Goal 1) verbalize/demonstrate/understanding of diagnosis and precautions       Goal 2) verbalize purpose of orthosis, wearing schedule, care and precautions       Goal 3) don/doff orthosis correctly and independently       Goal 4) verbalize/demonstrate home program, activity modification and/or adaptive equipment    All goals set today and have been met.    Plan of care was developed with input and agreement by the patient      Theodore Monteiro OT

## 2024-02-20 ENCOUNTER — TREATMENT (OUTPATIENT)
Dept: OCCUPATIONAL THERAPY | Facility: CLINIC | Age: 24
End: 2024-02-20
Payer: COMMERCIAL

## 2024-02-20 DIAGNOSIS — S66.921A LACERATION OF UNSPECIFIED MUSCLE, FASCIA AND TENDON AT WRIST AND HAND LEVEL, RIGHT HAND, INITIAL ENCOUNTER: ICD-10-CM

## 2024-02-20 DIAGNOSIS — S61.551A OPEN BITE OF RIGHT WRIST, INITIAL ENCOUNTER: ICD-10-CM

## 2024-02-20 DIAGNOSIS — S66.921D LACERATION OF WRIST, RIGHT, WITH TENDON INVOLVEMENT, SUBSEQUENT ENCOUNTER: Primary | ICD-10-CM

## 2024-02-20 DIAGNOSIS — S61.511D LACERATION OF WRIST, RIGHT, WITH TENDON INVOLVEMENT, SUBSEQUENT ENCOUNTER: Primary | ICD-10-CM

## 2024-02-20 PROCEDURE — 97140 MANUAL THERAPY 1/> REGIONS: CPT | Mod: GO | Performed by: OCCUPATIONAL THERAPIST

## 2024-02-20 PROCEDURE — 97035 APP MDLTY 1+ULTRASOUND EA 15: CPT | Mod: GO | Performed by: OCCUPATIONAL THERAPIST

## 2024-02-20 NOTE — PROGRESS NOTES
Occupational Therapy Outpatinet Treatment Note      Patient Name: Kalyn Kearns  MRN: [id number]  Today's Date: 2/20/2024  Time Calculation  Start Time: 0330  Stop Time: 0411  Time Calculation (min): 41 min    Insurance:  Visit number: 5 of 15  Authorization: required  Insurance info:  Tesha Denise Certification Period:   Start: 1/29/24   End: 6/28/24      Current Problem  1. Laceration of wrist, right, with tendon involvement, subsequent encounter        2. Open bite of right wrist, initial encounter  Follow Up In Occupational Therapy      3. Laceration of unspecified muscle, fascia and tendon at wrist and hand level, right hand, initial encounter  Follow Up In Occupational Therapy          Referred by: Dr. Rosen  Diagnosis: Zone 6 extensor tendon repair    Subjective   Edema blisters as of today have resolved about 90% as well as dorsal hand swelling  DOI: 1/5/24  DOS: 1/11/24    Precautions:   NWB    Pain:   1-5/10  Location: R dorsal hand hand and wrist  Description: aching, tight, swollen      Patients Living Environment: Reviewed and no concern  Lives with:   Primary Language: English  Patient's Goal(s) for Therapy: regain my normal hand function      Objective     RIGHT HAND   (ext/flex)   MCP PIP DIP DPC   IF  0/0 0/80 0/45    MF 0/0 0/70 0/45    RF 0/0 0/60 0/45    SF 0/0 0/60 0/5    Thumb 0/0          Edema:  R hand: 45.6cm  L hand: 41cm      Outcome Measures:        Quick DASH:  75%      HOME PROGRAM      1) wrist AROM      2) Isolated EDC AROM      3) Tenodesis AROM       4) Towel scrunches AROM      5) MEM      6) Scar massage      7) reverse MCP blocking      8) composite fist with wrist AROM                   TODAY'S TREATMENT    Ultrasound  - 3.3MHz x 1.0Wcm2 to dorsal hand surgical site to increase scar tissue extensibility x 8 min    Manual:  - MEM dorsal hand and thumb and wrist to reduce dorsal hand swelling x 5 minutes  - IASTM extensive scar massage to reduce underlying adhesions and  increase scar extensibility x 10min  - wrist stretching extension and flexion to reduce dorsal stiffness x 5 minutes  - Composite fist stretching to increase range of motion followed by AROM with reverse MCP blocking x 10 minutes        EDUCATION: Risk/benefits discussed, Home exercise program, orthosis wear schedule/ purpose/ precautions, care of orthosis, wound care, edema control, activity modifications, joint protection, pain/symptom management, injury pathology, and plan of care,        Assessment:  Extrinsic hand tightness still significantly present preventing MCP flexion with composite fist.  PROM greater.  Patient was provided with flexion glove as of today to begin utilizing 2-3x/day for an hour to increase MCP flexion.  She is exhibiting wrinkle return of dorsal hand indicative of reduced dwelling.  Also exhibits functional thumb extension AROM.        Plan:  Planned Interventions include: therapeutic exercise, self-care home management, manual therapy, therapeutic activities, , neuromuscular coordination, vasopneumatic, dry needling, and orthosis fabrication.  Frequency: 2 x Week  Duration: 12 Weeks  Goals: Set and discussed today      Goals - Patient will:       Goal 1) verbalize/demonstrate/understanding of diagnosis and precautions       Goal 2) verbalize purpose of orthosis, wearing schedule, care and precautions       Goal 3) don/doff orthosis correctly and independently       Goal 4) verbalize/demonstrate home program, activity modification and/or adaptive equipment    All goals set today and have been met.    Plan of care was developed with input and agreement by the patient      Theodore Monteiro OT

## 2024-02-22 ENCOUNTER — TREATMENT (OUTPATIENT)
Dept: OCCUPATIONAL THERAPY | Facility: CLINIC | Age: 24
End: 2024-02-22
Payer: COMMERCIAL

## 2024-02-22 DIAGNOSIS — S61.551A OPEN BITE OF RIGHT WRIST, INITIAL ENCOUNTER: ICD-10-CM

## 2024-02-22 DIAGNOSIS — S66.921A LACERATION OF UNSPECIFIED MUSCLE, FASCIA AND TENDON AT WRIST AND HAND LEVEL, RIGHT HAND, INITIAL ENCOUNTER: ICD-10-CM

## 2024-02-22 PROCEDURE — 97035 APP MDLTY 1+ULTRASOUND EA 15: CPT | Mod: GO | Performed by: OCCUPATIONAL THERAPIST

## 2024-02-22 PROCEDURE — 97140 MANUAL THERAPY 1/> REGIONS: CPT | Mod: GO | Performed by: OCCUPATIONAL THERAPIST

## 2024-02-22 NOTE — PROGRESS NOTES
"    Occupational Therapy Outpatinet Treatment Note      Patient Name: Kalyn Kearns  MRN: [id number]  Today's Date: 2/22/2024  Time Calculation  Start Time: 0935  Stop Time: 1014  Time Calculation (min): 39 min    Insurance:  Visit number: 6 of 15  Authorization: required  Insurance info:  Tesha Denise Certification Period:   Start: 1/29/24   End: 6/28/24      Current Problem  1. Open bite of right wrist, initial encounter  Follow Up In Occupational Therapy      2. Laceration of unspecified muscle, fascia and tendon at wrist and hand level, right hand, initial encounter  Follow Up In Occupational Therapy          Referred by: Dr. Rosen  Diagnosis: Zone 6 extensor tendon repair    Subjective   Post op 6 weeks today.  \"I am using the tool you made to massage the scar a lot and its not as tough now\"    DOI: 1/5/24  DOS: 1/11/24    Precautions:   NWB    Pain:   1-5/10  Location: R dorsal hand hand and wrist  Description: aching, tight, swollen      Patients Living Environment: Reviewed and no concern  Primary Language: English  Patient's Goal(s) for Therapy: regain my normal hand function      Objective     RIGHT HAND   (ext/flex)   MCP PIP DIP DPC   IF  0/0 0/80 0/45    MF 0/0 0/70 0/45    RF 0/0 0/60 0/45    SF 0/0 0/60 0/5    Thumb 0/0          Edema:  R hand: 42.8cm  L hand: 41cm      Outcome Measures:        Quick DASH:  75%      HOME PROGRAM      1) wrist AROM      2) Isolated EDC AROM      3) Tenodesis AROM       4) Towel scrunches AROM      5) MEM      6) Instrument assisted Scar massage      7) reverse MCP blocking      8) composite fist with wrist AROM                   TODAY'S TREATMENT    Ultrasound  - 3.3MHz x 1.0Wcm2 to dorsal hand surgical site to increase scar tissue extensibility x 8 min    Manual:  - IASTM extensive scar massage to reduce underlying adhesions and increase scar extensibility x 5min  - wrist stretching extension and flexion to reduce dorsal stiffness x 5 minutes  - Composite fist " stretching to increase range of motion followed by AROM with reverse MCP blocking x 10 minutes  - extensive isolated EDC stretching to reduce extrinsic tightness x 10min        EDUCATION: Risk/benefits discussed, Home exercise program, orthosis wear schedule/ purpose/ precautions, care of orthosis, wound care, edema control, activity modifications, joint protection, pain/symptom management, injury pathology, and plan of care,        Assessment:  Extrinsic hand tightness still significantly present preventing MCP flexion with composite fist.  Figure 8 hand swelling reduced from 45cm to 42.8cm.  Surgical site adhesions continue to cause difficulty with EDC excursion as a result.  IPJ ROM of all digits WNL with intrinsic minus position.        Plan:  Planned Interventions include: therapeutic exercise, self-care home management, manual therapy, therapeutic activities, , neuromuscular coordination, vasopneumatic, dry needling, and orthosis fabrication.  Frequency: 2 x Week  Duration: 12 Weeks  Goals: Set and discussed today      Goals - Patient will:       Goal 1) verbalize/demonstrate/understanding of diagnosis and precautions       Goal 2) verbalize purpose of orthosis, wearing schedule, care and precautions       Goal 3) don/doff orthosis correctly and independently       Goal 4) verbalize/demonstrate home program, activity modification and/or adaptive equipment    All goals set today and have been met.    Plan of care was developed with input and agreement by the patient      Theodore Monteiro OT

## 2024-02-26 ENCOUNTER — OFFICE VISIT (OUTPATIENT)
Dept: ORTHOPEDIC SURGERY | Facility: CLINIC | Age: 24
End: 2024-02-26
Payer: COMMERCIAL

## 2024-02-26 DIAGNOSIS — S61.511A LACERATION OF RIGHT WRIST WITH TENDON INVOLVEMENT, INITIAL ENCOUNTER: Primary | ICD-10-CM

## 2024-02-26 DIAGNOSIS — S66.921A LACERATION OF RIGHT WRIST WITH TENDON INVOLVEMENT, INITIAL ENCOUNTER: Primary | ICD-10-CM

## 2024-02-26 PROCEDURE — 99024 POSTOP FOLLOW-UP VISIT: CPT | Performed by: ORTHOPAEDIC SURGERY

## 2024-02-26 PROCEDURE — 1036F TOBACCO NON-USER: CPT | Performed by: ORTHOPAEDIC SURGERY

## 2024-02-26 NOTE — PROGRESS NOTES
Date of surgery: 01/11/24         Surgery(s) performed: Right wrist zone 7 extensor tendon repair ECRB, EPL, EIP, EDC index, EDC middle finger.       Patient presents today for her second post-operative visit, performed on 02/26/24.    She has been working in therapy and has therapy scheduled through the end of March. She has been able to use her hand for keyboarding and writing activities, but has a lot of tightness with attempts at wrist flexion and finger flexion.       Exam findings: Healed surgical incisions and traumatic wounds. Very subtle extensor lag at the index, middle and ring finger MP joints. Able to demonstrate full IP joint flexion but fairly rigid MP joint extension tightness. Limitations to active and passive wrist flexion.     Impression: Sequela of zone 7 extensor tendon lacerations right wrist     Plan: She will continue with therapy efforts to try and improve her wrist and MP joint flexion. She will follow up in 6 weeks for a repeat clinical examination. If her motion limitations are as such that she cannot return to activiites, we can discuss the options for tenolysis at her next visit.           Eber Rosen MD          [facility]     Department of Orthopaedic Surgery     Chief of Hand and Upper Extremity Surgery     [facility]     Scribe Attestation  By signing my name below, Lucy THOMAS Scribe   attest that this documentation has been prepared under the direction and in the presence of Eber Rosen MD.

## 2024-02-27 ENCOUNTER — TREATMENT (OUTPATIENT)
Dept: OCCUPATIONAL THERAPY | Facility: CLINIC | Age: 24
End: 2024-02-27
Payer: COMMERCIAL

## 2024-02-27 DIAGNOSIS — S61.551A OPEN BITE OF RIGHT WRIST, INITIAL ENCOUNTER: ICD-10-CM

## 2024-02-27 DIAGNOSIS — S66.921D LACERATION OF WRIST, RIGHT, WITH TENDON INVOLVEMENT, SUBSEQUENT ENCOUNTER: Primary | ICD-10-CM

## 2024-02-27 DIAGNOSIS — S66.921A LACERATION OF UNSPECIFIED MUSCLE, FASCIA AND TENDON AT WRIST AND HAND LEVEL, RIGHT HAND, INITIAL ENCOUNTER: ICD-10-CM

## 2024-02-27 DIAGNOSIS — S61.511D LACERATION OF WRIST, RIGHT, WITH TENDON INVOLVEMENT, SUBSEQUENT ENCOUNTER: Primary | ICD-10-CM

## 2024-02-27 PROCEDURE — L3808 WHFO, RIGID W/O JOINTS: HCPCS | Performed by: OCCUPATIONAL THERAPIST

## 2024-02-27 NOTE — PROGRESS NOTES
"    Occupational Therapy Outpatinet Treatment Note      Patient Name: Kalyn Kearns  MRN: [id number]  Today's Date: 2/27/2024       Insurance:  Visit number: 7 of 15  Authorization: required  Insurance info:  Tesha   Howie Certification Period:   Start: 1/29/24   End: 6/28/24      Current Problem  1. Open bite of right wrist, initial encounter  Follow Up In Occupational Therapy      2. Laceration of unspecified muscle, fascia and tendon at wrist and hand level, right hand, initial encounter  Follow Up In Occupational Therapy          Referred by: Dr. Rosen  Diagnosis: Zone 6 extensor tendon repair    Subjective   \"I am using the tool you made to massage the scar a lot and its not as tough now\"    DOI: 1/5/24  DOS: 1/11/24    Precautions:   NWB    Pain:   1-5/10  Location: R dorsal hand hand and wrist  Description: aching, tight, swollen      Patients Living Environment: Reviewed and no concern  Primary Language: English  Patient's Goal(s) for Therapy: regain my normal hand function      Objective     RIGHT HAND   (ext/flex)       MCP flexion intrinsic plus: 40 degrees       MCP flexion intrinsic minus: 30 degrees    Edema:  R hand: 42.8cm  L hand: 41cm      Outcome Measures:        Quick DASH:  75%      HOME PROGRAM      1) wrist AROM      2) Isolated EDC AROM      3) Tenodesis AROM       4) Towel scrunches AROM      5) MEM      6) Instrument assisted Scar massage      7) reverse MCP blocking      8) composite fist with wrist AROM                   TODAY'S TREATMENT    Orthosis fabrication:  - dorsal block orthosis fabricated in max intrinsic plus position to allow greater extrinsic stretching with ruber band flexion glove simultaneously         EDUCATION: Risk/benefits discussed, Home exercise program, orthosis wear schedule/ purpose/ precautions, care of orthosis, wound care, edema control, activity modifications, joint protection, pain/symptom management, injury pathology, and plan of care,    "     Assessment:  Patient very understanding of purpose and benefit of orthosis wear schedule.  She trialed this stretch with the rubber band glove for 10 minutes today and found to have a greater stretch localized to the dorsal hand.  Permitted there is progress with MCP flexion ROM we will continue each with with static progressive splinting.      Plan:  Planned Interventions include: therapeutic exercise, self-care home management, manual therapy, therapeutic activities, , neuromuscular coordination, vasopneumatic, dry needling, and orthosis fabrication.  Frequency: 2 x Week  Duration: 12 Weeks  Goals: Set and discussed today      Goals - Patient will:       Goal 1) verbalize/demonstrate/understanding of diagnosis and precautions       Goal 2) verbalize purpose of orthosis, wearing schedule, care and precautions       Goal 3) don/doff orthosis correctly and independently       Goal 4) verbalize/demonstrate home program, activity modification and/or adaptive equipment    All goals set today and have been met.    Plan of care was developed with input and agreement by the patient      Theodore Monteiro OT

## 2024-02-29 ENCOUNTER — TREATMENT (OUTPATIENT)
Dept: OCCUPATIONAL THERAPY | Facility: CLINIC | Age: 24
End: 2024-02-29
Payer: COMMERCIAL

## 2024-02-29 DIAGNOSIS — S66.921A LACERATION OF UNSPECIFIED MUSCLE, FASCIA AND TENDON AT WRIST AND HAND LEVEL, RIGHT HAND, INITIAL ENCOUNTER: ICD-10-CM

## 2024-02-29 DIAGNOSIS — S61.551A OPEN BITE OF RIGHT WRIST, INITIAL ENCOUNTER: ICD-10-CM

## 2024-02-29 PROCEDURE — 97110 THERAPEUTIC EXERCISES: CPT | Mod: GO | Performed by: OCCUPATIONAL THERAPIST

## 2024-02-29 PROCEDURE — 97035 APP MDLTY 1+ULTRASOUND EA 15: CPT | Mod: GO | Performed by: OCCUPATIONAL THERAPIST

## 2024-02-29 NOTE — PROGRESS NOTES
"    Occupational Therapy Outpatinet Treatment Note      Patient Name: Kalyn Kearns  MRN: [id number]  Today's Date: 2/29/2024  Time Calculation  Start Time: 0400  Stop Time: 0440  Time Calculation (min): 40 min    Insurance:  Visit number: 9 of 15  Authorization: required  Insurance info:  Tesha Denise Certification Period:   Start: 1/29/24   End: 6/28/24      Current Problem  1. Open bite of right wrist, initial encounter  Follow Up In Occupational Therapy      2. Laceration of unspecified muscle, fascia and tendon at wrist and hand level, right hand, initial encounter  Follow Up In Occupational Therapy          Referred by: Dr. Rosen  Diagnosis: Zone 6 extensor tendon repair    Subjective   \"The new brace definitely helps me stretch a lot more.  The top of my hand is pretty sore after an hour of stretching\"    DOI: 1/5/24  DOS: 1/11/24    Precautions:   NWB    Pain:   1-5/10  Location: R dorsal hand hand and wrist  Description: aching, tight, swollen      Patients Living Environment: Reviewed and no concern  Primary Language: English  Patient's Goal(s) for Therapy: regain my normal hand function      Objective     RIGHT HAND   (ext/flex)       MCP flexion intrinsic plus: 40 degrees       MCP flexion intrinsic minus: 30 degrees    Edema:  R hand: 42.8cm  L hand: 41cm      Outcome Measures:        Quick DASH:  75%      HOME PROGRAM      1) wrist AROM      2) Isolated EDC AROM      3) Tenodesis AROM       4) Towel scrunches AROM      5) MEM      6) Instrument assisted Scar massage      7) reverse MCP blocking      8) composite fist with wrist AROM                   TODAY'S TREATMENT  Ultrasound  - 3.3MHz x 1.0Wcm2 to dorsal hand surgical site to increase scar tissue extensibility x 8 min    Therapeutic ex:  - green flexbar sup/pron to increase gross grasping ROM 5x10  - wrist ABCs with 2lb DB and intrinsic grasp to increase strength 3x  - puttycise large knob to increase gross grasp function x 10 min "     EDUCATION: Risk/benefits discussed, Home exercise program, orthosis wear schedule/ purpose/ precautions, care of orthosis, wound care, edema control, activity modifications, joint protection, pain/symptom management, injury pathology, and plan of care,        Assessment:  Exhibited difficulty today with gross composite grasping given extrinsic scar adhesions at the dorsal hand and wrist.  She was able to partially grasp with compensatory movement patterns.      Plan:  Planned Interventions include: therapeutic exercise, self-care home management, manual therapy, therapeutic activities, , neuromuscular coordination, vasopneumatic, dry needling, and orthosis fabrication.    Frequency: 2 x Week  Duration: 12 Weeks  Goals: Set and discussed today      Goals - Patient will:       Goal 1) verbalize/demonstrate/understanding of diagnosis and precautions       Goal 2) verbalize purpose of orthosis, wearing schedule, care and precautions       Goal 3) don/doff orthosis correctly and independently       Goal 4) verbalize/demonstrate home program, activity modification and/or adaptive equipment    All goals set today and have been met.    Plan of care was developed with input and agreement by the patient      Theodore Monteiro OT

## 2024-03-05 ENCOUNTER — TREATMENT (OUTPATIENT)
Dept: OCCUPATIONAL THERAPY | Facility: CLINIC | Age: 24
End: 2024-03-05
Payer: COMMERCIAL

## 2024-03-05 DIAGNOSIS — S66.921A LACERATION OF UNSPECIFIED MUSCLE, FASCIA AND TENDON AT WRIST AND HAND LEVEL, RIGHT HAND, INITIAL ENCOUNTER: ICD-10-CM

## 2024-03-05 DIAGNOSIS — S61.551A OPEN BITE OF RIGHT WRIST, INITIAL ENCOUNTER: ICD-10-CM

## 2024-03-05 DIAGNOSIS — S61.511D LACERATION OF WRIST, RIGHT, WITH TENDON INVOLVEMENT, SUBSEQUENT ENCOUNTER: Primary | ICD-10-CM

## 2024-03-05 DIAGNOSIS — S66.921D LACERATION OF WRIST, RIGHT, WITH TENDON INVOLVEMENT, SUBSEQUENT ENCOUNTER: Primary | ICD-10-CM

## 2024-03-05 PROCEDURE — 97763 ORTHC/PROSTC MGMT SBSQ ENC: CPT | Mod: GO | Performed by: OCCUPATIONAL THERAPIST

## 2024-03-05 PROCEDURE — 97035 APP MDLTY 1+ULTRASOUND EA 15: CPT | Mod: GO | Performed by: OCCUPATIONAL THERAPIST

## 2024-03-05 NOTE — PROGRESS NOTES
Occupational Therapy Outpatinet Treatment Note      Patient Name: Kalyn Kearns  MRN: [id number]  Today's Date: 3/5/2024  Time Calculation  Start Time: 0930    Insurance:  Visit number: 10 of 15  Authorization: required  Insurance info:  Tesha   Howie Certification Period:   Start: 1/29/24   End: 6/28/24      Current Problem  1. Laceration of wrist, right, with tendon involvement, subsequent encounter            Referred by: Dr. Rosen  Diagnosis: Zone 6 extensor tendon repair    Subjective   Pt arrived demonstrating improved ability to maintain intrinsic plus position and then actively flex her IPJs into a more functional fist    DOI: 1/5/24  DOS: 1/11/24    Precautions:   NWB    Pain:   1-5/10  Location: R dorsal hand hand and wrist  Description: aching, tight, swollen      Patients Living Environment: Reviewed and no concern  Primary Language: English  Patient's Goal(s) for Therapy: regain my normal hand function      Objective     RIGHT HAND   (ext/flex)       MCP flexion intrinsic plus position: 50-55 degrees    RIGHT HAND AROM (Degrees)          DPC   IF  5.5   MF 6.5   RF 6   SF 5           Edema:  R hand: 42.8cm  L hand: 41cm      Outcome Measures:        Quick DASH:  75%      HOME PROGRAM      1) wrist AROM      2) Isolated EDC AROM      3) Tenodesis AROM       4) Towel scrunches AROM      5) MEM      6) Instrument assisted Scar massage      7) reverse MCP blocking      8) composite fist with wrist AROM                   TODAY'S TREATMENT  Ultrasound  - 3.3MHz x 1.0Wcm2 to dorsal hand surgical site to increase scar tissue extensibility x 8 min    Orthosis mgt sbsq encounter:  - adjusted DBO to increase flexion angle to promote greater pull on dorsal hand and wrist tightness in flexion glove      EDUCATION: Risk/benefits discussed, Home exercise program, orthosis wear schedule/ purpose/ precautions, care of orthosis, wound care, edema control, activity modifications, joint protection, pain/symptom  management, injury pathology, and plan of care,        Assessment:  Demonstrates improved flexion at all levels of digits 2-5 and was andrew to measure A/F to DPC for first time today given this ROM improvement.  MCP flexion improved 10-15 degrees since last visit.  DBO was adjusted to position MCP in greater flexion since now has greater ROM.  Following this adjustment she reported to feel a 3x greater stretch to the dorsal hand and wrist using the flexion glove in the new resting position.      Plan:  Planned Interventions include: therapeutic exercise, self-care home management, manual therapy, therapeutic activities, , neuromuscular coordination, vasopneumatic, dry needling, and orthosis fabrication.    Frequency: 2 x Week  Duration: 12 Weeks  Goals: Set and discussed today      Goals - Patient will:       Goal 1) verbalize/demonstrate/understanding of diagnosis and precautions       Goal 2) verbalize purpose of orthosis, wearing schedule, care and precautions       Goal 3) don/doff orthosis correctly and independently       Goal 4) verbalize/demonstrate home program, activity modification and/or adaptive equipment    All goals set today and have been met.    Plan of care was developed with input and agreement by the patient      Theodore Monteiro OT

## 2024-03-07 ENCOUNTER — APPOINTMENT (OUTPATIENT)
Dept: OCCUPATIONAL THERAPY | Facility: CLINIC | Age: 24
End: 2024-03-07
Payer: COMMERCIAL

## 2024-03-12 ENCOUNTER — APPOINTMENT (OUTPATIENT)
Dept: OCCUPATIONAL THERAPY | Facility: CLINIC | Age: 24
End: 2024-03-12
Payer: COMMERCIAL

## 2024-03-14 ENCOUNTER — TREATMENT (OUTPATIENT)
Dept: OCCUPATIONAL THERAPY | Facility: CLINIC | Age: 24
End: 2024-03-14
Payer: COMMERCIAL

## 2024-03-14 ENCOUNTER — APPOINTMENT (OUTPATIENT)
Dept: OCCUPATIONAL THERAPY | Facility: CLINIC | Age: 24
End: 2024-03-14
Payer: COMMERCIAL

## 2024-03-14 DIAGNOSIS — S66.921D LACERATION OF WRIST, RIGHT, WITH TENDON INVOLVEMENT, SUBSEQUENT ENCOUNTER: Primary | ICD-10-CM

## 2024-03-14 DIAGNOSIS — S61.511D LACERATION OF WRIST, RIGHT, WITH TENDON INVOLVEMENT, SUBSEQUENT ENCOUNTER: Primary | ICD-10-CM

## 2024-03-14 PROCEDURE — 97763 ORTHC/PROSTC MGMT SBSQ ENC: CPT | Mod: GO | Performed by: OCCUPATIONAL THERAPIST

## 2024-03-17 NOTE — PROGRESS NOTES
Occupational Therapy Outpatinet Treatment Note      Patient Name: Kayln Kearns  MRN: [id number]  Today's Date: 3/17/2024  Time Calculation  Start Time: 0455  Stop Time: 0520  Time Calculation (min): 25 min    Insurance:  Visit number: 11 of 15  Authorization: required  Insurance info:  Tesha Denise Certification Period:   Start: 1/29/24   End: 6/28/24      Current Problem  1. Laceration of wrist, right, with tendon involvement, subsequent encounter              Referred by: Dr. Rosen  Diagnosis: Zone 6 extensor tendon repair    Subjective   Pt arrives reporting she lost her DBO over the weekend and is requesting a new DBO be fabricated so she may continue with her stretching program    DOI: 1/5/24  DOS: 1/11/24    Precautions:   NWB    Pain:   1-5/10  Location: R dorsal hand hand and wrist  Description: aching, tight, swollen      Objective     RIGHT HAND   (ext/flex)       MCP flexion intrinsic plus position: 50-55 degrees    RIGHT HAND AROM (Degrees)          DPC   IF  5.5   MF 6.5   RF 6   SF 5         Edema:  R hand: 42cm  L hand: 41cm      Outcome Measures:        Quick DASH:  75%      HOME PROGRAM      1) wrist AROM      2) Isolated EDC AROM      3) Tenodesis AROM       4) MEM      5) Instrument assisted Scar massage      6) reverse MCP blocking      7) composite fist with wrist AROM                   TODAY'S TREATMENT      Orthosis mgt sbsq encounter:  - fabricated new DBO with max MCP flexion to continue to stretch extrinsic tightness      EDUCATION: Risk/benefits discussed, Home exercise program, orthosis wear schedule/ purpose/ precautions, care of orthosis, wound care, edema control, activity modifications, joint protection, pain/symptom management, injury pathology, and plan of care,        Assessment:  Demonstrates improved flexion at all levels of digits 2-5 and was andrew to measure A/F to DPC for first time today given this ROM improvement.  MCP flexion improved 10-15 degrees since last  visit.  DBO was adjusted to position MCP in greater flexion since now has greater ROM.  Following this adjustment she reported to feel a 3x greater stretch to the dorsal hand and wrist using the flexion glove in the new resting position.      Plan:  Planned Interventions include: therapeutic exercise, self-care home management, manual therapy, therapeutic activities, , neuromuscular coordination, vasopneumatic, dry needling, and orthosis fabrication.    Frequency: 2 x Week  Duration: 12 Weeks  Goals: Set and discussed today      Goals - Patient will:       Goal 1) verbalize/demonstrate/understanding of diagnosis and precautions       Goal 2) verbalize purpose of orthosis, wearing schedule, care and precautions       Goal 3) don/doff orthosis correctly and independently       Goal 4) verbalize/demonstrate home program, activity modification and/or adaptive equipment    All goals set today and have been met.    Plan of care was developed with input and agreement by the patient      Theodore Monteiro OT

## 2024-03-19 ENCOUNTER — APPOINTMENT (OUTPATIENT)
Dept: OCCUPATIONAL THERAPY | Facility: CLINIC | Age: 24
End: 2024-03-19
Payer: COMMERCIAL

## 2024-03-21 ENCOUNTER — TREATMENT (OUTPATIENT)
Dept: OCCUPATIONAL THERAPY | Facility: CLINIC | Age: 24
End: 2024-03-21
Payer: COMMERCIAL

## 2024-03-21 DIAGNOSIS — S61.511D LACERATION OF WRIST, RIGHT, WITH TENDON INVOLVEMENT, SUBSEQUENT ENCOUNTER: Primary | ICD-10-CM

## 2024-03-21 DIAGNOSIS — S66.921D LACERATION OF WRIST, RIGHT, WITH TENDON INVOLVEMENT, SUBSEQUENT ENCOUNTER: Primary | ICD-10-CM

## 2024-03-21 PROCEDURE — 97035 APP MDLTY 1+ULTRASOUND EA 15: CPT | Mod: GO | Performed by: OCCUPATIONAL THERAPIST

## 2024-03-21 PROCEDURE — 97140 MANUAL THERAPY 1/> REGIONS: CPT | Mod: GO | Performed by: OCCUPATIONAL THERAPIST

## 2024-03-21 NOTE — PROGRESS NOTES
Occupational Therapy Outpatinet Treatment Note      Patient Name: Kalyn Kearns  MRN: [id number]  Today's Date: 3/21/2024  Time Calculation  Start Time: 0945  Stop Time: 1015  Time Calculation (min): 30 min    Insurance:  Visit number: 12 of 15  Authorization: required  Insurance info:  Tesha Denise Certification Period:   Start: 1/29/24   End: 6/28/24      Current Problem  1. Laceration of wrist, right, with tendon involvement, subsequent encounter              Referred by: Dr. Rosen  Diagnosis: Zone 6 extensor tendon repair    Subjective   Pt arrives reporting she lost her DBO over the weekend and is requesting a new DBO be fabricated so she may continue with her stretching program    DOI: 1/5/24  DOS: 1/11/24    Precautions:   NWB    Pain:   1-5/10  Location: R dorsal hand hand and wrist  Description: aching, tight, swollen      Objective     RIGHT HAND   (ext/flex)       MCP flexion intrinsic plus position: 50-55 degrees    RIGHT HAND AROM (Degrees)          DPC   IF  2   MF 1.5   RF 1   SF 1         Edema:  R hand: 41.5cm  L hand: 41cm      Outcome Measures:        Quick DASH:  75%      HOME PROGRAM      1) wrist AROM      2) Isolated EDC AROM      3) Tenodesis AROM       4) MEM      5) Instrument assisted Scar massage      6) reverse MCP blocking      7) composite fist with wrist AROM                   TODAY'S TREATMENT      Ultrasound  - 3.3MHz x 1.0Wcm2 to dorsal hand surgical site to increase scar tissue extensibility x 8 min    Manual:  - IASTM extensive scar massage to reduce underlying adhesions and increase scar extensibility x 15min  - wrist stretching extension and flexion to reduce dorsal stiffness x 5 minutes        EDUCATION: Risk/benefits discussed, Home exercise program, orthosis wear schedule/ purpose/ precautions, care of orthosis, wound care, edema control, activity modifications, joint protection, pain/symptom management, injury pathology, and plan of care,         Assessment:  Demonstrates improved flexion at all levels of digits 2-5  and is now 1-2cm from DPC.  She has made phenomenal progress since using the flexion glove in combination with a DBO.       Plan:  Planned Interventions include: therapeutic exercise, self-care home management, manual therapy, therapeutic activities, , neuromuscular coordination, vasopneumatic, dry needling, and orthosis fabrication.    Frequency: 2 x Week  Duration: 12 Weeks  Goals: Set and discussed today      Goals - Patient will:       Goal 1) verbalize/demonstrate/understanding of diagnosis and precautions       Goal 2) verbalize purpose of orthosis, wearing schedule, care and precautions       Goal 3) don/doff orthosis correctly and independently       Goal 4) verbalize/demonstrate home program, activity modification and/or adaptive equipment    All goals set today and have been met.    Plan of care was developed with input and agreement by the patient      Theodore Monteiro OT

## 2024-04-04 ENCOUNTER — TREATMENT (OUTPATIENT)
Dept: OCCUPATIONAL THERAPY | Facility: CLINIC | Age: 24
End: 2024-04-04
Payer: COMMERCIAL

## 2024-04-04 DIAGNOSIS — S66.921D LACERATION OF WRIST, RIGHT, WITH TENDON INVOLVEMENT, SUBSEQUENT ENCOUNTER: Primary | ICD-10-CM

## 2024-04-04 DIAGNOSIS — S66.921A LACERATION OF UNSPECIFIED MUSCLE, FASCIA AND TENDON AT WRIST AND HAND LEVEL, RIGHT HAND, INITIAL ENCOUNTER: ICD-10-CM

## 2024-04-04 DIAGNOSIS — S61.511D LACERATION OF WRIST, RIGHT, WITH TENDON INVOLVEMENT, SUBSEQUENT ENCOUNTER: Primary | ICD-10-CM

## 2024-04-04 DIAGNOSIS — S61.551A OPEN BITE OF RIGHT WRIST, INITIAL ENCOUNTER: ICD-10-CM

## 2024-04-04 PROCEDURE — 97112 NEUROMUSCULAR REEDUCATION: CPT | Mod: 59,GO | Performed by: OCCUPATIONAL THERAPIST

## 2024-04-04 PROCEDURE — 97110 THERAPEUTIC EXERCISES: CPT | Mod: GO | Performed by: OCCUPATIONAL THERAPIST

## 2024-04-04 PROCEDURE — 97035 APP MDLTY 1+ULTRASOUND EA 15: CPT | Mod: GO | Performed by: OCCUPATIONAL THERAPIST

## 2024-04-04 NOTE — PROGRESS NOTES
"    Occupational Therapy Outpatinet Treatment Note      Patient Name: Kalyn Kearns  MRN: [id number]  Today's Date: 4/4/2024  Time Calculation  Start Time: 0933  Stop Time: 1016  Time Calculation (min): 43 min    Insurance:  Visit number: 13 of 15  Authorization: required  Insurance info:  Tesha Denise Certification Period:   Start: 1/29/24   End: 6/28/24      Current Problem  1. Laceration of wrist, right, with tendon involvement, subsequent encounter        2. Open bite of right wrist, initial encounter  Follow Up In Occupational Therapy      3. Laceration of unspecified muscle, fascia and tendon at wrist and hand level, right hand, initial encounter  Follow Up In Occupational Therapy            Referred by: Dr. Rosen  Diagnosis: Zone 6 extensor tendon repair    Subjective   \"My ROM is improving.  The only pain I feel is when I massage the scar but overall my hand feels more weak than anything.  I am hesitant to try and lift anything heavier\".    DOI: 1/5/24  DOS: 1/11/24    Precautions:   NWB      Pain:   1/10  Location: R dorsal hand hand and wrist  Description: aching, tight, swollen      Objective     RIGHT HAND   (ext/flex)       IF MCP flexion composite fist position: 50 degrees       MF MCP flexion composite fist position: 65 degrees    RIGHT HAND AROM (Degrees)          DPC   IF  1   MF 5mm   RF 0   SF 0     HAND STRENGTH (Lbs)   R L   Dynamometer  40 70       Edema:  R hand: 41.5cm  L hand: 41cm      Outcome Measures:        Quick DASH:  75%      HOME PROGRAM      1) wrist AROM      2) Isolated EDC AROM      3) Tenodesis AROM       4) MEM      5) Instrument assisted Scar massage      6) reverse MCP blocking      7) composite fist with wrist AROM                   TODAY'S TREATMENT      Ultrasound  - 3.3MHz x 1.0Wcm2 to dorsal hand surgical site to increase scar tissue extensibility x 8 min    Neuro re-ed:  - ASL AROM within fluidotherapy to re-ed proprioception and motor control of digits and " desensitization of dorsal scar x 12min    Therapeutic ex:  - green flexbar sup/pron to increase  and wrist strength x 5 min  - crumble and spread paper actively to increase finger dexterity  - isotonic wrist strengthening with 4lb DB 3x12 extension and flexion        EDUCATION: Risk/benefits discussed, Home exercise program, orthosis wear schedule/ purpose/ precautions, care of orthosis, wound care, edema control, activity modifications, joint protection, pain/symptom management, injury pathology, and plan of care,        Assessment:  Baseline  strength able to be recorded today for the first time which reveled 40lb on her surgical hand.  This is attributed to her continued ROM progress.  Patient will formally begin  strengthening now while continuing with her stretching program at home.  IF MCP flexion remains at about 50 degrees in composite fist however her MF MCP flexion is greatly improved to 65 degrees.      Plan:  Planned Interventions include: therapeutic exercise, self-care home management, manual therapy, therapeutic activities, , neuromuscular coordination, vasopneumatic, dry needling, and orthosis fabrication.    Frequency: 1-2 x Week  Duration: 12 Weeks    Goals - Patient will:       Goal 1) Pt will demo about 45 degrees MCP flexion in order to make composite fist, in 8 weeks    PROGRESSING       Goal 2) Pt will demo at least 45lb R  strength in order to open jars for meal prep tasks, in 8 weeks    PROGRESSING       Goal 3) Pt will correctly return demo of stretching program within DBO and flexion glove, in 4 weeks.    GOAL MET      All goals set today and have been met.    Plan of care was developed with input and agreement by the patient.      Theodore Monteiro, OT

## 2024-04-07 NOTE — PROGRESS NOTES
[facility]  Hand and Upper Extremity Service  Post Operative visit         Date of surgery: 1/11/24    Surgery(s) performed: Right wrist exploration with extensor tendon repair (R)              Subjective report: Making good progress in therapy. Back to work. Isn't in any significant amount of pain.          Exam findings; Traumatic and surgical wounds over dorsal aspect of left wrist are well healed. With wrist in neutral extension she is able to make nearly a complete composite fist with only some tightness involving her index finger MP joint flexion. When wrist is brought into a position of extension she has better composite finger flexion but when wrist is brought into flexion she has more extensor tightness over the MP joints.               Radiograph findings: No new images obtained      Impression: Right wrist zone 7 extensor tendon injury       Plan: Overall I think she is doing great and has made great progress since her last visit with me. She should continue with her therapy efforts. She will return to see me in 6 weeks for repeat clinical evaluation. I do not anticipate the need for any tenosynitis type procedures.          Medications Prescribed: None        Follow Up: 6 weeks               Eber Rosen MD    [facility] [employer]  Department of Orthopaedic Surgery  Chief of Hand and Upper Extremity Surgery  [facility]    Scribe Attestation  By signing my name below, IFauzia Scribe   attest that this documentation has been prepared under the direction and in the presence of Dr. Eber Rosen.      Dictation performed with the use of voice recognition software. Syntax and grammatical errors may exist.

## 2024-04-08 ENCOUNTER — OFFICE VISIT (OUTPATIENT)
Dept: ORTHOPEDIC SURGERY | Facility: CLINIC | Age: 24
End: 2024-04-08
Payer: COMMERCIAL

## 2024-04-08 VITALS — WEIGHT: 137 LBS | BODY MASS INDEX: 24.27 KG/M2 | HEIGHT: 63 IN

## 2024-04-08 DIAGNOSIS — S66.921A LACERATION OF RIGHT WRIST WITH TENDON INVOLVEMENT, INITIAL ENCOUNTER: Primary | ICD-10-CM

## 2024-04-08 DIAGNOSIS — S61.511A LACERATION OF RIGHT WRIST WITH TENDON INVOLVEMENT, INITIAL ENCOUNTER: Primary | ICD-10-CM

## 2024-04-08 PROCEDURE — 1036F TOBACCO NON-USER: CPT | Performed by: ORTHOPAEDIC SURGERY

## 2024-04-08 PROCEDURE — 99024 POSTOP FOLLOW-UP VISIT: CPT | Performed by: ORTHOPAEDIC SURGERY

## 2024-04-08 ASSESSMENT — PAIN - FUNCTIONAL ASSESSMENT: PAIN_FUNCTIONAL_ASSESSMENT: NO/DENIES PAIN

## 2024-04-18 ENCOUNTER — TREATMENT (OUTPATIENT)
Dept: OCCUPATIONAL THERAPY | Facility: CLINIC | Age: 24
End: 2024-04-18
Payer: COMMERCIAL

## 2024-04-18 DIAGNOSIS — S66.921A LACERATION OF UNSPECIFIED MUSCLE, FASCIA AND TENDON AT WRIST AND HAND LEVEL, RIGHT HAND, INITIAL ENCOUNTER: ICD-10-CM

## 2024-04-18 DIAGNOSIS — S61.551A OPEN BITE OF RIGHT WRIST, INITIAL ENCOUNTER: ICD-10-CM

## 2024-04-18 PROCEDURE — 97763 ORTHC/PROSTC MGMT SBSQ ENC: CPT | Mod: GO | Performed by: OCCUPATIONAL THERAPIST

## 2024-04-18 PROCEDURE — 97112 NEUROMUSCULAR REEDUCATION: CPT | Mod: 59,GO | Performed by: OCCUPATIONAL THERAPIST

## 2024-04-18 PROCEDURE — 97035 APP MDLTY 1+ULTRASOUND EA 15: CPT | Mod: GO | Performed by: OCCUPATIONAL THERAPIST

## 2024-04-18 NOTE — PROGRESS NOTES
"    Occupational Therapy Outpatinet Treatment Note      Patient Name: Kalyn Kearns  MRN: [id number]  Today's Date: 4/18/2024       Insurance:  Visit number: 14 of 15  Authorization: required  Insurance info:  Tesha Denise Certification Period:   Start: 1/29/24   End: 6/28/24      Current Problem  1. Open bite of right wrist, initial encounter  Follow Up In Occupational Therapy      2. Laceration of unspecified muscle, fascia and tendon at wrist and hand level, right hand, initial encounter  Follow Up In Occupational Therapy            Referred by: Dr. Rosen  Diagnosis: Zone 6 extensor tendon repair    Subjective   \" I saw my doctor and he isvery pleased with my progress and does not feel I need to have a follow up surgery\".    DOI: 1/5/24  DOS: 1/11/24    Precautions:   NWB      Pain:   1/10  Location: R dorsal hand hand and wrist  Description: aching, tight, swollen      Objective     RIGHT HAND   (ext/flex)       IF MCP flexion composite fist position: 50 degrees       MF MCP flexion composite fist position: 65 degrees    RIGHT HAND AROM (Degrees)          DPC   IF  0   MF 0   RF 0   SF 0     HAND STRENGTH (Lbs)   R L   Dynamometer  40 70       Edema:  R hand: 41.5cm  L hand: 41cm      Outcome Measures:        Quick DASH:  75%      HOME PROGRAM      1) wrist AROM      2) Isolated EDC AROM      3) Tenodesis AROM       4) MEM      5) Instrument assisted Scar massage      6) reverse MCP blocking      7) composite fist with wrist AROM                   TODAY'S TREATMENT      Ultrasound  - 3.3MHz x 1.0Wcm2 to dorsal hand surgical site to increase scar tissue extensibility x 8 min    Neuro re-ed:  - ASL AROM within fluidotherapy to re-ed proprioception and motor control of digits and desensitization of dorsal scar x 12min    Orthosis sbsq encounter  - adjusted DBO to position greater MCP flexion to further reduce extrinsic hand tightness x 18 min        EDUCATION: Risk/benefits discussed, Home exercise " program, orthosis wear schedule/ purpose/ precautions, care of orthosis, wound care, edema control, activity modifications, joint protection, pain/symptom management, injury pathology, and plan of care,        Assessment:  IF MCP flexion to 55 degrees in composite fist and MF MCP flexion is the same at 65 degrees AROM however PROM is significantly improved to about 80 degrees both digits.  After adjustment to dorsal component of orthosis she reported again to feel a comfortable stretch to the dorsal hand.      Plan:  Planned Interventions include: therapeutic exercise, self-care home management, manual therapy, therapeutic activities, , neuromuscular coordination, vasopneumatic, dry needling, and orthosis fabrication.    Frequency: 1-2 x Week  Duration: 12 Weeks    Goals - Patient will:       Goal 1) Pt will demo about 45 degrees MCP flexion in order to make composite fist, in 8 weeks    PROGRESSING       Goal 2) Pt will demo at least 45lb R  strength in order to open jars for meal prep tasks, in 8 weeks    PROGRESSING       Goal 3) Pt will correctly return demo of stretching program within DBO and flexion glove, in 4 weeks.    GOAL MET      All goals set today and have been met.    Plan of care was developed with input and agreement by the patient.      Theodore Monteiro, OT

## 2024-05-03 ENCOUNTER — TELEPHONE (OUTPATIENT)
Dept: OBSTETRICS AND GYNECOLOGY | Facility: CLINIC | Age: 24
End: 2024-05-03
Payer: COMMERCIAL

## 2024-05-03 NOTE — TELEPHONE ENCOUNTER
Called pt as we have not seen her since 2022. Pt doesn't remember who prescribed her rx since then- she has scheduled an ape later this month- she will call the prescriber for refill- or go to urgent care  to get rx until her ape    ----- Message from Chantell Lynn sent at 5/3/2024  3:28 PM EDT -----  Pt Kalyn called to request an RX refill for her birth control pills. The pharmacy sent over auth request for refills Please call her @ [phone]. Thanks

## 2024-05-16 ENCOUNTER — TREATMENT (OUTPATIENT)
Dept: OCCUPATIONAL THERAPY | Facility: CLINIC | Age: 24
End: 2024-05-16
Payer: COMMERCIAL

## 2024-05-16 DIAGNOSIS — S66.921D LACERATION OF WRIST, RIGHT, WITH TENDON INVOLVEMENT, SUBSEQUENT ENCOUNTER: Primary | ICD-10-CM

## 2024-05-16 DIAGNOSIS — S61.511D LACERATION OF WRIST, RIGHT, WITH TENDON INVOLVEMENT, SUBSEQUENT ENCOUNTER: Primary | ICD-10-CM

## 2024-05-16 PROCEDURE — 97110 THERAPEUTIC EXERCISES: CPT | Mod: GO | Performed by: OCCUPATIONAL THERAPIST

## 2024-05-16 NOTE — PROGRESS NOTES
"    Occupational Therapy Outpatinet Treatment Note      Patient Name: Kalyn Kearns  MRN: [id number]  Today's Date: 5/16/2024       Insurance:  Visit number: 15 of 15  Authorization: required  Insurance info:  Tesha Denise Certification Period:   Start: 1/29/24   End: 6/28/24      Current Problem  1. Laceration of wrist, right, with tendon involvement, subsequent encounter            Referred by: Dr. Rosen  Diagnosis: Zone 6 extensor tendon repair    Subjective   \"I can make a full fist now and I dont feel a stretch anymore.  My hand is getting stronger.  Im glad I can avoid surgery now\"    DOI: 1/5/24  DOS: 1/11/24    Precautions:   none      Pain:   1/10  Location: R dorsal hand hand and wrist  Description: aching, tight, swollen      Objective     RIGHT HAND   (ext/flex)     - full composite fist    RIGHT HAND AROM (Degrees)          DPC   IF  0   MF 0   RF 0   SF 0     HAND STRENGTH (Lbs)   R L   Dynamometer  55 70       Edema:  R hand: 41cm  L hand: 41cm      Outcome Measures:        Quick DASH:  75%      HOME PROGRAM      1) wrist AROM      2) Isolated EDC AROM      3) Tenodesis AROM       4) MEM      5) Instrument assisted Scar massage      6) reverse MCP blocking      7) composite fist with wrist AROM                   TODAY'S TREATMENT    Therapeutic ex:  - blue flexbar sup/pron to increase wrist and  strength 5x12  - puttycise large knob green putty to increase  strength x 5 min  - finger ADD green putty ot increase intrinsic strength 3x10 each web space  - MCP flexion green putty to increase strength 5x10  - ASL AROM to increase finger dexterity and coordination 4x  - reviewed HEP with green putty to ensure proper carryover to home x 5 min          EDUCATION: Risk/benefits discussed, Home exercise program, orthosis wear schedule/ purpose/ precautions, care of orthosis, wound care, edema control, activity modifications, joint protection, pain/symptom management, injury pathology, and plan " of care,        Assessment:  Pt has achieved all of ehr goals to this point.  She understands to focus more on strengthening for long term HEP and to resume all daily tasks without compensation.  She was provided with green putty to take home to increase her intrinsic  strength.  No concerns at this point and safe to continue progressing her strength independently.      Plan:  discharge    Goals - Patient will:       Goal 1) Pt will demo about 45 degrees MCP flexion in order to make composite fist, in 8 weeks    Goal Met       Goal 2) Pt will demo at least 45lb R  strength in order to open jars for meal prep tasks, in 8 weeks  GOAL MET       Goal 3) Pt will correctly return demo of stretching program within DBO and flexion glove, in 4 weeks.    GOAL MET      All goals set today and have been met.    Plan of care was developed with input and agreement by the patient.      Theodore Monteiro, OT

## 2024-05-17 ENCOUNTER — OFFICE VISIT (OUTPATIENT)
Dept: OBSTETRICS AND GYNECOLOGY | Facility: CLINIC | Age: 24
End: 2024-05-17
Payer: COMMERCIAL

## 2024-05-17 VITALS
DIASTOLIC BLOOD PRESSURE: 76 MMHG | HEART RATE: 94 BPM | BODY MASS INDEX: 29.21 KG/M2 | WEIGHT: 164.9 LBS | SYSTOLIC BLOOD PRESSURE: 114 MMHG

## 2024-05-17 DIAGNOSIS — Z12.4 PAP SMEAR FOR CERVICAL CANCER SCREENING: Primary | ICD-10-CM

## 2024-05-17 DIAGNOSIS — Z71.85 VACCINE COUNSELING: ICD-10-CM

## 2024-05-17 DIAGNOSIS — Z11.3 ENCOUNTER FOR SCREENING EXAMINATION FOR SEXUALLY TRANSMITTED DISEASE: ICD-10-CM

## 2024-05-17 DIAGNOSIS — Z71.3 ENCOUNTER FOR DIETARY COUNSELING AND SURVEILLANCE: ICD-10-CM

## 2024-05-17 PROCEDURE — 88175 CYTOPATH C/V AUTO FLUID REDO: CPT | Mod: TC | Performed by: STUDENT IN AN ORGANIZED HEALTH CARE EDUCATION/TRAINING PROGRAM

## 2024-05-17 PROCEDURE — 87491 CHLMYD TRACH DNA AMP PROBE: CPT | Performed by: STUDENT IN AN ORGANIZED HEALTH CARE EDUCATION/TRAINING PROGRAM

## 2024-05-17 PROCEDURE — 87661 TRICHOMONAS VAGINALIS AMPLIF: CPT | Performed by: STUDENT IN AN ORGANIZED HEALTH CARE EDUCATION/TRAINING PROGRAM

## 2024-05-17 PROCEDURE — 99395 PREV VISIT EST AGE 18-39: CPT | Performed by: STUDENT IN AN ORGANIZED HEALTH CARE EDUCATION/TRAINING PROGRAM

## 2024-05-17 PROCEDURE — 1036F TOBACCO NON-USER: CPT | Performed by: STUDENT IN AN ORGANIZED HEALTH CARE EDUCATION/TRAINING PROGRAM

## 2024-05-17 PROCEDURE — 99395 PREV VISIT EST AGE 18-39: CPT | Mod: GC | Performed by: STUDENT IN AN ORGANIZED HEALTH CARE EDUCATION/TRAINING PROGRAM

## 2024-05-17 RX ORDER — NORGESTIMATE AND ETHINYL ESTRADIOL 0.25-0.035
KIT ORAL
COMMUNITY
Start: 2018-12-21

## 2024-05-17 NOTE — PROGRESS NOTES
Chief complaint: ***    Assessment/Plan:    Kalyn Kearns is a 23 y.o. No obstetric history on file. who presents for annual gyn exam.      Preventive health  - Pap due ***  - HPV vaccine {HPVvax:45155}  - mammogram due ***  - colonoscopy due ***  - STI screening today, per CDC guidelines  - vaccines: due for ***    Reproductive Life Planning  -      -------------------------------------  HPI      Gynecologic History:    Menarche: ***  Menses: LMP No LMP recorded., ***  Last Pap: ***  HPV Vaccine: ***  History of Dysplasia: ***  STI history: ***  Sexually active: ***  Contraception: ***    OB History:   OB History    No obstetric history on file.         Medical History:   Past Medical History:   Diagnosis Date    Chlamydial infection, unspecified 12/24/2018    Chlamydia infection    Encounter for general adult medical examination without abnormal findings 06/22/2021    Encounter for annual physical exam    Encounter for pregnancy test, result negative     Negative pregnancy test    Persons encountering health services in other specified circumstances 12/21/2018    Encounter to establish care        Surgical History:   Past Surgical History:   Procedure Laterality Date    OTHER SURGICAL HISTORY  06/22/2021    No history of surgery    WRIST SURGERY      right wrist exploration with extensor tendon repair        Social History:  Occupation:   Exercise:  Abuse:    Family History:  Otherwise negative for breast, colon, or gynecologic malignancy.    Objective:  There were no vitals filed for this visit.    OBGyn Exam  Cervix:  ***

## 2024-05-17 NOTE — PROGRESS NOTES
I saw and evaluated the patient. I personally obtained the key and critical portions of the history and physical exam or was physically present for key and critical portions performed by the resident/fellow. I reviewed the resident/fellow's documentation and discussed the patient with the resident/fellow. I agree with the resident/fellow's medical decision making as documented in the note.    Erika Jordan MD
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
## 2024-05-20 ENCOUNTER — OFFICE VISIT (OUTPATIENT)
Dept: ORTHOPEDIC SURGERY | Facility: CLINIC | Age: 24
End: 2024-05-20
Payer: COMMERCIAL

## 2024-05-20 VITALS — WEIGHT: 164 LBS | BODY MASS INDEX: 29.06 KG/M2 | HEIGHT: 63 IN

## 2024-05-20 DIAGNOSIS — S61.511A LACERATION OF RIGHT WRIST WITH TENDON INVOLVEMENT, INITIAL ENCOUNTER: Primary | ICD-10-CM

## 2024-05-20 DIAGNOSIS — S66.921A LACERATION OF RIGHT WRIST WITH TENDON INVOLVEMENT, INITIAL ENCOUNTER: Primary | ICD-10-CM

## 2024-05-20 PROCEDURE — 99213 OFFICE O/P EST LOW 20 MIN: CPT | Performed by: ORTHOPAEDIC SURGERY

## 2024-05-20 PROCEDURE — 1036F TOBACCO NON-USER: CPT | Performed by: ORTHOPAEDIC SURGERY

## 2024-05-20 ASSESSMENT — PAIN - FUNCTIONAL ASSESSMENT: PAIN_FUNCTIONAL_ASSESSMENT: NO/DENIES PAIN

## 2024-05-20 NOTE — PROGRESS NOTES
[facility]  Hand and Upper Extremity Service  Post Operative visit         Date of surgery: 1/11/24    Surgery(s) performed: Right wrist exploration with extensor tendon repair          Subjective report: Second postoperative visit. She's now finished with formal therapy and using her hand for all normal day-to-day activities.          Exam findings: Right wrist reveals surgical incision and traumatic wounds well healed. Full finger and thumb extension and excellent wrist extension. With wrist extension, she's able to nearly make a full fist and with fist formation she's able to show about 10 - 15 degrees of combined wrist and finger flexion.          Radiograph findings: No new images obtained      Impression: Sequela of multiple extensor tendon lacerations right wrist      Plan: Overall I think she's done great in her recovery. She has very functional use of her right wrist and hand. I have no restrictions for her. She doesn't need to continue with any formal therapy and there's no need to consider tenolysis because she's restored such excellent functional use of hand.       Follow Up: As needed            Eber Rosen MD    [facility] [employer]  Department of Orthopaedic Surgery  Chief of Hand and Upper Extremity Surgery  [facility]    Scribe Attestation  By signing my name below, IFauzia Scribe   attest that this documentation has been prepared under the direction and in the presence of Dr. Eber Rosen.      Dictation performed with the use of voice recognition software. Syntax and grammatical errors may exist.

## 2024-05-21 LAB
C TRACH RRNA SPEC QL NAA+PROBE: NEGATIVE
N GONORRHOEA DNA SPEC QL PROBE+SIG AMP: NEGATIVE
T VAGINALIS RRNA SPEC QL NAA+PROBE: NEGATIVE

## 2024-05-22 ENCOUNTER — TELEPHONE (OUTPATIENT)
Dept: MATERNAL FETAL MEDICINE | Facility: CLINIC | Age: 24
End: 2024-05-22
Payer: COMMERCIAL

## 2024-05-22 NOTE — TELEPHONE ENCOUNTER
Called to schedule Nutrition consult. Left voicemail message with office contact information: [phone]

## 2024-05-30 ENCOUNTER — APPOINTMENT (OUTPATIENT)
Dept: OBSTETRICS AND GYNECOLOGY | Facility: CLINIC | Age: 24
End: 2024-05-30
Payer: COMMERCIAL

## 2024-06-04 LAB
CYTOLOGY CMNT CVX/VAG CYTO-IMP: NORMAL
LAB AP HPV GENOTYPE QUESTION: YES
LAB AP HPV HR: NORMAL
LAB AP PAP ADDITIONAL TESTS: NORMAL
LABORATORY COMMENT REPORT: NORMAL
LMP START DATE: NORMAL
PATH REPORT.TOTAL CANCER: NORMAL

## 2024-10-10 ENCOUNTER — TELEPHONE (OUTPATIENT)
Dept: OBSTETRICS AND GYNECOLOGY | Facility: CLINIC | Age: 24
End: 2024-10-10
Payer: COMMERCIAL

## 2024-10-10 DIAGNOSIS — Z30.019 ENCOUNTER FOR FEMALE BIRTH CONTROL: Primary | ICD-10-CM

## 2024-10-10 RX ORDER — NORGESTIMATE AND ETHINYL ESTRADIOL 7DAYSX3 28
1 KIT ORAL DAILY
Qty: 28 TABLET | Refills: 10 | Status: SHIPPED | OUTPATIENT
Start: 2024-10-10 | End: 2025-10-10

## 2024-10-10 NOTE — TELEPHONE ENCOUNTER
CRM msg for sprintec birth control pills- out x 1 wk  Last seen 5/2024 for annual - notes reflect pt is on sprintec but with no noted recent refills . She reports this office has been doing the refills.  Out x 1 week now- advised when she gets a refill to wait until her next period- she reports she is not sexually active at this time  Msg sent to Dr Jordan for review

## 2024-11-13 DIAGNOSIS — Z30.019 ENCOUNTER FOR FEMALE BIRTH CONTROL: Primary | ICD-10-CM

## 2024-11-13 RX ORDER — NORGESTIMATE AND ETHINYL ESTRADIOL 7DAYSX3 LO
1 KIT ORAL DAILY
Qty: 28 TABLET | Refills: 11 | Status: SHIPPED | OUTPATIENT
Start: 2024-11-13 | End: 2025-11-13

## 2024-11-14 ENCOUNTER — TELEPHONE (OUTPATIENT)
Dept: OBSTETRICS AND GYNECOLOGY | Facility: CLINIC | Age: 24
End: 2024-11-14
Payer: COMMERCIAL

## 2024-11-14 NOTE — TELEPHONE ENCOUNTER
----- Message from Erika Jordan sent at 11/13/2024  4:32 PM EST -----  Regarding: RE: ocp  I sent a different one.  ----- Message -----  From: NIKKI Nunez RN  Sent: 11/13/2024   3:34 PM EST  To: Erika Jordan MD  Subject: ocp                                               Pt states current ocp's that you prescribed often make her sick and she wants to know if you  would prescribe something else

## 2024-11-14 NOTE — TELEPHONE ENCOUNTER
Pt notified----- Message from Erika Jordan sent at 11/13/2024  4:32 PM EST -----  Regarding: RE: ocp  I sent a different one.  ----- Message -----  From: NIKKI Nunez RN  Sent: 11/13/2024   3:34 PM EST  To: Erika Jordan MD  Subject: ocp                                               Pt states current ocp's that you prescribed often make her sick and she wants to know if you  would prescribe something else

## (undated) DEVICE — GLOVE, SURGICAL, PROTEXIS PI BLUE W/NEUTHERA, 8.0, PF, LF

## (undated) DEVICE — CUFF, TOURNIQUET, 18 X 4, DUAL PORT/SNGL BLADDER, DISP, LF

## (undated) DEVICE — SLING, ARM, MEDIUM

## (undated) DEVICE — Device

## (undated) DEVICE — NEEDLE, TAPER, MAYO, 1/2 CIRCLE, DISPOSABLE, 5

## (undated) DEVICE — SUTURE, VICRYL, 3-0, 27 IN, SH

## (undated) DEVICE — APPLICATOR, CHLORAPREP, W/ORANGE TINT, 26ML

## (undated) DEVICE — COVER HANDLE LIGHT, STERIS, BLUE, STERILE

## (undated) DEVICE — GLOVE, SURGICAL, PROTEXIS PI MICRO, 7.5, PF, LF

## (undated) DEVICE — SLEEVE, SCD EXPRESS, KNEE LENGTH-MEDIUM

## (undated) DEVICE — DRAPE, SHEET, THREE QUARTER, FAN FOLD, 57 X 77 IN

## (undated) DEVICE — KIT, MINOR, DOUBLE BASIN

## (undated) DEVICE — SUTURE, MONOCRYL, 4-0, 18 IN, PS2, UNDYED

## (undated) DEVICE — SOLUTION, IRRIGATION, SODIUM CHLORIDE 0.9%, 1000 ML, POUR BOTTLE

## (undated) DEVICE — BANDAGE, ESMARK 4 IN X 9 FT, STERILE